# Patient Record
Sex: FEMALE | Race: WHITE | NOT HISPANIC OR LATINO | Employment: OTHER | URBAN - METROPOLITAN AREA
[De-identification: names, ages, dates, MRNs, and addresses within clinical notes are randomized per-mention and may not be internally consistent; named-entity substitution may affect disease eponyms.]

---

## 2020-03-23 ENCOUNTER — HOSPITAL ENCOUNTER (EMERGENCY)
Facility: HOSPITAL | Age: 35
Discharge: HOME | End: 2020-03-23
Admitting: EMERGENCY MEDICINE

## 2020-03-23 ENCOUNTER — APPOINTMENT (OUTPATIENT)
Dept: CT IMAGING | Facility: HOSPITAL | Age: 35
End: 2020-03-23

## 2020-03-23 ENCOUNTER — APPOINTMENT (OUTPATIENT)
Dept: GENERAL RADIOLOGY | Facility: HOSPITAL | Age: 35
End: 2020-03-23

## 2020-03-23 VITALS
WEIGHT: 274.91 LBS | SYSTOLIC BLOOD PRESSURE: 121 MMHG | BODY MASS INDEX: 48.71 KG/M2 | HEIGHT: 63 IN | DIASTOLIC BLOOD PRESSURE: 74 MMHG | RESPIRATION RATE: 16 BRPM | TEMPERATURE: 98 F | HEART RATE: 62 BPM | OXYGEN SATURATION: 98 %

## 2020-03-23 DIAGNOSIS — S09.90XA MINOR HEAD INJURY, INITIAL ENCOUNTER: ICD-10-CM
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
## 2020-03-23 DIAGNOSIS — S39.012A STRAIN OF LUMBAR REGION, INITIAL ENCOUNTER: ICD-10-CM

## 2020-03-23 DIAGNOSIS — S16.1XXA STRAIN OF NECK MUSCLE, INITIAL ENCOUNTER: ICD-10-CM

## 2020-03-23 LAB — B-HCG UR QL: NEGATIVE

## 2020-03-23 PROCEDURE — 72125 CT NECK SPINE W/O DYE: CPT

## 2020-03-23 PROCEDURE — 70450 CT HEAD/BRAIN W/O DYE: CPT

## 2020-03-23 PROCEDURE — 72110 X-RAY EXAM L-2 SPINE 4/>VWS: CPT

## 2020-03-23 PROCEDURE — 72131 CT LUMBAR SPINE W/O DYE: CPT

## 2020-03-23 PROCEDURE — 99283 EMERGENCY DEPT VISIT LOW MDM: CPT

## 2020-03-23 PROCEDURE — 81025 URINE PREGNANCY TEST: CPT | Performed by: NURSE PRACTITIONER

## 2020-03-23 RX ORDER — CYCLOBENZAPRINE HCL 10 MG
10 TABLET ORAL 3 TIMES DAILY PRN
Qty: 15 TABLET | Refills: 0 | Status: SHIPPED | OUTPATIENT
Start: 2020-03-23

## 2020-08-18 ENCOUNTER — HOSPITAL ENCOUNTER (EMERGENCY)
Facility: HOSPITAL | Age: 35
Discharge: HOME | End: 2020-08-18
Attending: EMERGENCY MEDICINE | Admitting: EMERGENCY MEDICINE

## 2020-08-18 ENCOUNTER — APPOINTMENT (OUTPATIENT)
Dept: CT IMAGING | Facility: HOSPITAL | Age: 35
End: 2020-08-18

## 2020-08-18 VITALS
TEMPERATURE: 98.1 F | SYSTOLIC BLOOD PRESSURE: 110 MMHG | WEIGHT: 240.52 LBS | DIASTOLIC BLOOD PRESSURE: 71 MMHG | HEART RATE: 125 BPM | BODY MASS INDEX: 42.62 KG/M2 | RESPIRATION RATE: 15 BRPM | HEIGHT: 63 IN | OXYGEN SATURATION: 99 %

## 2020-08-18 DIAGNOSIS — S30.1XXA CONTUSION OF ABDOMINAL WALL, INITIAL ENCOUNTER: ICD-10-CM

## 2020-08-18 DIAGNOSIS — S22.41XA CLOSED FRACTURE OF MULTIPLE RIBS OF RIGHT SIDE, INITIAL ENCOUNTER: ICD-10-CM
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
## 2020-08-18 PROCEDURE — 99282 EMERGENCY DEPT VISIT SF MDM: CPT

## 2020-08-18 PROCEDURE — 71250 CT THORAX DX C-: CPT

## 2020-08-18 PROCEDURE — 74176 CT ABD & PELVIS W/O CONTRAST: CPT

## 2020-08-18 RX ORDER — SODIUM CHLORIDE 0.9 % (FLUSH) 0.9 %
10 SYRINGE (ML) INJECTION AS NEEDED
Status: DISCONTINUED | OUTPATIENT
Start: 2020-08-18 | End: 2020-08-18

## 2020-08-18 RX ORDER — METHADONE HYDROCHLORIDE 10 MG/ML
105 CONCENTRATE ORAL DAILY
COMMUNITY

## 2020-08-18 RX ORDER — BUPROPION HYDROCHLORIDE 150 MG/1
TABLET, EXTENDED RELEASE ORAL 2 TIMES DAILY
COMMUNITY

## 2020-08-18 RX ORDER — HYDROCODONE BITARTRATE AND ACETAMINOPHEN 5; 325 MG/1; MG/1
1 TABLET ORAL EVERY 6 HOURS PRN
Qty: 10 TABLET | Refills: 0 | Status: SHIPPED | OUTPATIENT
Start: 2020-08-18

## 2020-08-18 RX ORDER — QUETIAPINE FUMARATE 50 MG/1
TABLET, EXTENDED RELEASE ORAL NIGHTLY
COMMUNITY
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
## 2020-08-18 NOTE — DISCHARGE INSTRUCTIONS
Cough and deep breathe.  Return for fever coughing up blood shortness of breath black or bloody stool bloody urine or any other new or worse problems or concerns.  No heavy lifting or excessive physical activity until improved.  And cleared by your primary care doctor.  Do not combine pain medicines with your methadone one or the other
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
## 2022-01-21 ENCOUNTER — HOSPITAL ENCOUNTER (EMERGENCY)
Facility: HOSPITAL | Age: 37
Discharge: HOME | End: 2022-01-21
Attending: EMERGENCY MEDICINE | Admitting: EMERGENCY MEDICINE

## 2022-01-21 VITALS
RESPIRATION RATE: 16 BRPM | BODY MASS INDEX: 40.95 KG/M2 | DIASTOLIC BLOOD PRESSURE: 76 MMHG | WEIGHT: 239.86 LBS | OXYGEN SATURATION: 97 % | HEIGHT: 64 IN | HEART RATE: 59 BPM | SYSTOLIC BLOOD PRESSURE: 134 MMHG | TEMPERATURE: 98.8 F

## 2022-01-21 DIAGNOSIS — R11.2 NON-INTRACTABLE VOMITING WITH NAUSEA, UNSPECIFIED VOMITING TYPE: Primary | ICD-10-CM

## 2022-01-21 LAB
ALBUMIN SERPL-MCNC: 4.7 G/DL (ref 3.5–5.2)
ALBUMIN/GLOB SERPL: 1.2 G/DL
ALP SERPL-CCNC: 95 U/L (ref 39–117)
ALT SERPL W P-5'-P-CCNC: 26 U/L (ref 1–33)
AMPHET+METHAMPHET UR QL: NEGATIVE
ANION GAP SERPL CALCULATED.3IONS-SCNC: 12.4 MMOL/L (ref 5–15)
AST SERPL-CCNC: 22 U/L (ref 1–32)
BACTERIA UR QL AUTO: ABNORMAL /HPF
BARBITURATES UR QL SCN: NEGATIVE
BASOPHILS # BLD AUTO: 0.04 10*3/MM3 (ref 0–0.2)
BASOPHILS NFR BLD AUTO: 0.5 % (ref 0–1.5)
BENZODIAZ UR QL SCN: NEGATIVE
BILIRUB SERPL-MCNC: 0.4 MG/DL (ref 0–1.2)
BILIRUB UR QL STRIP: NEGATIVE
BUN SERPL-MCNC: 14 MG/DL (ref 6–20)
BUN/CREAT SERPL: 10.8 (ref 7–25)
CALCIUM SPEC-SCNC: 10.4 MG/DL (ref 8.6–10.5)
CANNABINOIDS SERPL QL: NEGATIVE
CHLORIDE SERPL-SCNC: 96 MMOL/L (ref 98–107)
CLARITY UR: ABNORMAL
CO2 SERPL-SCNC: 27.6 MMOL/L (ref 22–29)
COCAINE UR QL: NEGATIVE
COLOR UR: YELLOW
CREAT SERPL-MCNC: 1.3 MG/DL (ref 0.57–1)
DEPRECATED RDW RBC AUTO: 39.5 FL (ref 37–54)
EOSINOPHIL # BLD AUTO: 0.02 10*3/MM3 (ref 0–0.4)
EOSINOPHIL NFR BLD AUTO: 0.2 % (ref 0.3–6.2)
ERYTHROCYTE [DISTWIDTH] IN BLOOD BY AUTOMATED COUNT: 12.6 % (ref 12.3–15.4)
GFR SERPL CREATININE-BSD FRML MDRD: 46 ML/MIN/1.73
GLOBULIN UR ELPH-MCNC: 3.9 GM/DL
GLUCOSE SERPL-MCNC: 116 MG/DL (ref 65–99)
GLUCOSE UR STRIP-MCNC: NEGATIVE MG/DL
HCG INTACT+B SERPL-ACNC: <0.5 MIU/ML
HCT VFR BLD AUTO: 42.8 % (ref 34–46.6)
HGB BLD-MCNC: 14.2 G/DL (ref 12–15.9)
HGB UR QL STRIP.AUTO: NEGATIVE
HOLD SPECIMEN: NORMAL
HOLD SPECIMEN: NORMAL
HYALINE CASTS UR QL AUTO: ABNORMAL /LPF
IMM GRANULOCYTES # BLD AUTO: 0.02 10*3/MM3 (ref 0–0.05)
IMM GRANULOCYTES NFR BLD AUTO: 0.2 % (ref 0–0.5)
KETONES UR QL STRIP: NEGATIVE
LEUKOCYTE ESTERASE UR QL STRIP.AUTO: ABNORMAL
LIPASE SERPL-CCNC: 23 U/L (ref 13–60)
LYMPHOCYTES # BLD AUTO: 1.91 10*3/MM3 (ref 0.7–3.1)
LYMPHOCYTES NFR BLD AUTO: 21.5 % (ref 19.6–45.3)
MCH RBC QN AUTO: 28.7 PG (ref 26.6–33)
MCHC RBC AUTO-ENTMCNC: 33.2 G/DL (ref 31.5–35.7)
MCV RBC AUTO: 86.6 FL (ref 79–97)
METHADONE UR QL SCN: NEGATIVE
MONOCYTES # BLD AUTO: 0.71 10*3/MM3 (ref 0.1–0.9)
MONOCYTES NFR BLD AUTO: 8 % (ref 5–12)
NEUTROPHILS NFR BLD AUTO: 6.18 10*3/MM3 (ref 1.7–7)
NEUTROPHILS NFR BLD AUTO: 69.6 % (ref 42.7–76)
NITRITE UR QL STRIP: NEGATIVE
NRBC BLD AUTO-RTO: 0 /100 WBC (ref 0–0.2)
OPIATES UR QL: NEGATIVE
OXYCODONE UR QL SCN: NEGATIVE
PH UR STRIP.AUTO: 7.5 [PH] (ref 5–8)
PLATELET # BLD AUTO: 339 10*3/MM3 (ref 140–450)
PMV BLD AUTO: 9.8 FL (ref 6–12)
POTASSIUM SERPL-SCNC: 3.5 MMOL/L (ref 3.5–5.2)
PROT SERPL-MCNC: 8.6 G/DL (ref 6–8.5)
PROT UR QL STRIP: ABNORMAL
RBC # BLD AUTO: 4.94 10*6/MM3 (ref 3.77–5.28)
RBC # UR STRIP: ABNORMAL /HPF
REF LAB TEST METHOD: ABNORMAL
SODIUM SERPL-SCNC: 136 MMOL/L (ref 136–145)
SP GR UR STRIP: 1.03 (ref 1–1.03)
SQUAMOUS #/AREA URNS HPF: ABNORMAL /HPF
UROBILINOGEN UR QL STRIP: ABNORMAL
WBC # UR STRIP: ABNORMAL /HPF
WBC NRBC COR # BLD: 8.88 10*3/MM3 (ref 3.4–10.8)
WHOLE BLOOD HOLD SPECIMEN: NORMAL
WHOLE BLOOD HOLD SPECIMEN: NORMAL

## 2022-01-21 PROCEDURE — 80307 DRUG TEST PRSMV CHEM ANLYZR: CPT | Performed by: NURSE PRACTITIONER

## 2022-01-21 PROCEDURE — 99283 EMERGENCY DEPT VISIT LOW MDM: CPT

## 2022-01-21 PROCEDURE — 81001 URINALYSIS AUTO W/SCOPE: CPT | Performed by: EMERGENCY MEDICINE

## 2022-01-21 PROCEDURE — 85025 COMPLETE CBC W/AUTO DIFF WBC: CPT | Performed by: EMERGENCY MEDICINE

## 2022-01-21 PROCEDURE — 96374 THER/PROPH/DIAG INJ IV PUSH: CPT

## 2022-01-21 PROCEDURE — 96361 HYDRATE IV INFUSION ADD-ON: CPT

## 2022-01-21 PROCEDURE — 80053 COMPREHEN METABOLIC PANEL: CPT | Performed by: EMERGENCY MEDICINE

## 2022-01-21 PROCEDURE — 83690 ASSAY OF LIPASE: CPT | Performed by: EMERGENCY MEDICINE

## 2022-01-21 PROCEDURE — 25010000002 ONDANSETRON PER 1 MG: Performed by: NURSE PRACTITIONER

## 2022-01-21 PROCEDURE — 84702 CHORIONIC GONADOTROPIN TEST: CPT | Performed by: EMERGENCY MEDICINE

## 2022-01-21 RX ORDER — ONDANSETRON 2 MG/ML
4 INJECTION INTRAMUSCULAR; INTRAVENOUS ONCE
Status: COMPLETED | OUTPATIENT
Start: 2022-01-21 | End: 2022-01-21

## 2022-01-21 RX ORDER — SODIUM CHLORIDE 0.9 % (FLUSH) 0.9 %
10 SYRINGE (ML) INJECTION AS NEEDED
Status: DISCONTINUED | OUTPATIENT
Start: 2022-01-21 | End: 2022-01-21 | Stop reason: HOSPADM

## 2022-01-21 RX ORDER — ONDANSETRON 4 MG/1
4 TABLET, ORALLY DISINTEGRATING ORAL EVERY 4 HOURS PRN
Qty: 15 TABLET | Refills: 0 | Status: SHIPPED | OUTPATIENT
Start: 2022-01-21

## 2022-01-21 RX ADMIN — SODIUM CHLORIDE 1000 ML: 9 INJECTION, SOLUTION INTRAVENOUS at 06:03

## 2022-01-21 RX ADMIN — SODIUM CHLORIDE 1000 ML: 9 INJECTION, SOLUTION INTRAVENOUS at 08:10

## 2022-01-21 RX ADMIN — ONDANSETRON 4 MG: 2 INJECTION INTRAMUSCULAR; INTRAVENOUS at 06:03
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
## 2022-01-21 NOTE — DISCHARGE INSTRUCTIONS
Clear liquids.  Advance diet as tolerated.    Medication as prescribed.    Follow-up with PCP as needed.    Return for new or worsening symptoms

## 2024-08-28 ENCOUNTER — HOSPITAL ENCOUNTER (EMERGENCY)
Facility: HOSPITAL | Age: 39
Discharge: HOME | End: 2024-08-28
Attending: EMERGENCY MEDICINE
Payer: COMMERCIAL

## 2024-08-28 VITALS
OXYGEN SATURATION: 96 % | SYSTOLIC BLOOD PRESSURE: 113 MMHG | HEIGHT: 62 IN | HEART RATE: 70 BPM | RESPIRATION RATE: 18 BRPM | DIASTOLIC BLOOD PRESSURE: 62 MMHG | BODY MASS INDEX: 43.24 KG/M2 | TEMPERATURE: 98.3 F | WEIGHT: 235 LBS

## 2024-08-28 DIAGNOSIS — Z13.9 ENCOUNTER FOR MEDICAL SCREENING EXAMINATION: Primary | ICD-10-CM

## 2024-08-28 LAB
QT INTERVAL: 397 MS
QTC INTERVAL: 426 MS

## 2024-08-28 PROCEDURE — 99283 EMERGENCY DEPT VISIT LOW MDM: CPT

## 2024-08-28 PROCEDURE — 93005 ELECTROCARDIOGRAM TRACING: CPT | Performed by: EMERGENCY MEDICINE

## 2024-08-28 NOTE — ED PROVIDER NOTES
Time: 8:21 AM EDT  Date of encounter:  2024  Independent Historian/Clinical History and Information was obtained by:   Patient  Chief Complaint: Needing an EKG for medication    History is limited by: N/A    History of Present Illness:  Patient is a 39 y.o. year old female who presents to the emergency department for evaluation of possible QT prolongation.  Patient is currently in treatment at [facility].  The patient is that this is a methadone clinic.  Patient states that she was sent to the ER to have an EKG done due to her receiving methadone.  Patient has no physical complaints.  Patient has a letter with her from an advanced practitioner stating the patient is needing a baseline EKG related to QT prolongation and medication dosing.    HPI    Patient Care Team  Primary Care Provider: Provider, No Known    Past Medical History:     Allergies   Allergen Reactions    Codeine Nausea And Vomiting     Past Medical History:   Diagnosis Date    Bipolar 1 disorder     Depression      Past Surgical History:   Procedure Laterality Date     SECTION      CHOLECYSTECTOMY      REPLACEMENT TOTAL HIP LATERAL POSITION       History reviewed. No pertinent family history.    Home Medications:  Prior to Admission medications    Medication Sig Start Date End Date Taking? Authorizing Provider   buPROPion SR (WELLBUTRIN SR) 150 MG 12 hr tablet Take  by mouth 2 (Two) Times a Day.    Provider, MD Oscar   cyclobenzaprine (FLEXERIL) 10 MG tablet Take 1 tablet by mouth 3 (Three) Times a Day As Needed for Muscle Spasms. 3/23/20   Dinorah Watson APRN   diclofenac (VOLTAREN) 50 MG EC tablet Take 1 tablet by mouth 2 (Two) Times a Day As Needed (pain). 3/23/20   Dinorah Watson APRN   HYDROcodone-acetaminophen (NORCO) 5-325 MG per tablet Take 1 tablet by mouth Every 6 (Six) Hours As Needed for Severe Pain . 20   Clayton Beck MD   methadone (DOLOPHINE) 10 MG/ML solution  "Take 105 mg by mouth Daily.    ProviderOscar MD   ondansetron ODT (ZOFRAN-ODT) 4 MG disintegrating tablet Place 1 tablet on the tongue Every 4 (Four) Hours As Needed for Nausea or Vomiting. 1/21/22   Chiquita Mathews APRN   QUEtiapine fumarate ER (SEROquel XR) 50 MG tablet sustained-release 24 hour tablet Take  by mouth Every Night.    ProviderOscar MD   Topiramate (TOPAMAX PO) Take  by mouth.    Oscar Schneider MD        Social History:   Social History     Tobacco Use    Smoking status: Every Day     Current packs/day: 1.00     Types: Cigarettes   Substance Use Topics    Alcohol use: Never    Drug use: Not Currently         Review of Systems:  Review of Systems   Constitutional:  Negative for chills and fever.   HENT:  Negative for congestion, ear pain and sore throat.    Eyes:  Negative for pain.   Respiratory:  Negative for cough, chest tightness and shortness of breath.    Cardiovascular:  Negative for chest pain.   Gastrointestinal:  Negative for abdominal pain, diarrhea, nausea and vomiting.   Genitourinary:  Negative for flank pain and hematuria.   Musculoskeletal:  Negative for joint swelling.   Skin:  Negative for pallor.   Neurological:  Negative for seizures and headaches.   All other systems reviewed and are negative.       Physical Exam:  /62 (BP Location: Left arm, Patient Position: Sitting)   Pulse 70   Temp 98.3 °F (36.8 °C) (Oral)   Resp 18   Ht 157.5 cm (62\")   Wt 107 kg (235 lb)   LMP  (LMP Unknown)   SpO2 96%   BMI 42.98 kg/m²     Physical Exam    Vital signs were reviewed under triage note.  General appearance - Patient appears well-developed and well-nourished.  Patient is in no acute distress.  Head - Normocephalic, atraumatic.  Pupils - Equal, round, reactive to light.  Extraocular muscles are intact.  Conjunctiva is clear.  Nasal - Normal inspection.  No evidence of trauma or epistaxis.  Tympanic membranes - Gray, intact without erythema or " retractions.  Oral mucosa - Pink and moist without lesions or erythema.  Uvula is midline.  Chest wall - Atraumatic.  Chest wall is nontender.  There are no vesicular rashes noted.  Neck - Supple.  Trachea was midline.  There is no palpable lymphadenopathy or thyromegaly.  There are no meningeal signs  Lungs - Clear to auscultation and percussion bilaterally.  Heart - Regular rate and rhythm without any murmurs, clicks, or gallops.  Abdomen - Soft.  Obese.  Bowel sounds are present.  There is no palpable tenderness.  There is no rebound, guarding, or rigidity.  There are no palpable masses.  There are no pulsatile masses.  Back - Spine is straight and midline.  There is no CVA tenderness.  Extremities - Intact x4 with full range of motion.  There is no palpable edema.  Pulses are intact x4 and equal.  Neurologic - Patient is awake, alert, and oriented x3.  Cranial nerves II through XII are grossly intact.  Motor and sensory functions grossly intact.  Cerebellar function was normal.  Integument - There are no rashes.  There are no petechia or purpura lesions noted.  There are no vesicular lesions noted.           Procedures:  Procedures      Medical Decision Making:      Comorbidities that affect care:    Depression, bipolar disorder    External Notes reviewed:    Previous Clinic Note: Clinic note from [facility] done on 5/17/2024 was reviewed by me.      The following orders were placed and all results were independently analyzed by me:  Orders Placed This Encounter   Procedures    ECG 12 Lead Drug Monitoring       Medications Given in the Emergency Department:  Medications - No data to display     ED Course:    ED Course as of 08/28/24 1803   Wed Aug 28, 2024   0820 EKG performed at 735 was interpreted by me to show a normal sinus rhythm with a ventricular rate of 69 bpm.  The IL intervals 137 ms.  P waves are normal.  QRS interval is normal.  Axis was at 54 degrees.  There was no acute ischemic ST or T wave changes  identified.  QT corrected is normal at 426 ms. [TB]      ED Course User Index  [TB] Moses Eden DO       The patient was seen and evaluated the ED by me.  The above history and physical examination was performed.  EKG was performed.  EKG did not show any acute abnormalities.  Final interpretation is pending.  At this time patient be safely discharged home with outpatient follow-up.  I did explain to the patient that her provider who is managing her medication should either interpret the EKG herself and/or wait for the cardiology interpretation to ensure that there are no other abnormalities that would become worrisome from their medication standpoint.    Labs:    Lab Results (last 24 hours)       ** No results found for the last 24 hours. **             Imaging:    No Radiology Exams Resulted Within Past 24 Hours      Differential Diagnosis and Discussion:    Rule out QT prolongation due to medications.    EKG was interpreted by me.    MDM     Amount and/or Complexity of Data Reviewed  Tests in the medicine section of CPT®: reviewed             Patient Care Considerations:    LABS: I considered ordering labs, however laboratory data would not alter the ED treatment course or plan.      Consultants/Shared Management Plan:    None    Social Determinants of Health:    Patient is independent, reliable, and has access to care.       Disposition and Care Coordination:    Discharged: The patient is suitable and stable for discharge with no need for consideration of admission.    I have explained the patient´s condition, diagnoses and treatment plan based on the information available to me at this time. I have answered questions and addressed any concerns. The patient has a good  understanding of the patient´s diagnosis, condition, and treatment plan as can be expected at this point. The vital signs have been stable. The patient´s condition is stable and appropriate for discharge from the emergency department.      The  patient will pursue further outpatient evaluation with the primary care physician or other designated or consulting physician as outlined in the discharge instructions. They are agreeable to this plan of care and follow-up instructions have been explained in detail. The patient has received these instructions in written format and has expressed an understanding of the discharge instructions. The patient is aware that any significant change in condition or worsening of symptoms should prompt an immediate return to this or the closest emergency department or call to 911.    Final diagnoses:   Encounter for medical screening examination        ED Disposition       ED Disposition   Discharge    Condition   Stable    Comment   --               This medical record created using voice recognition software.             Moses Eden DO  08/28/24 1804

## 2024-08-28 NOTE — DISCHARGE INSTRUCTIONS
Resume normal home activities.  Follow-up with your medical provider as scheduled.  Return to the ER for any concerns issues that may arise.

## 2024-09-16 LAB
QT INTERVAL: 397 MS
QTC INTERVAL: 426 MS

## 2025-04-07 ENCOUNTER — HOSPITAL ENCOUNTER (EMERGENCY)
Facility: HOSPITAL | Age: 40
Discharge: HOME | End: 2025-04-08
Attending: EMERGENCY MEDICINE | Admitting: EMERGENCY MEDICINE
Payer: COMMERCIAL

## 2025-04-07 DIAGNOSIS — T81.30XA WOUND DEHISCENCE: Primary | ICD-10-CM

## 2025-04-07 DIAGNOSIS — Z51.89 VISIT FOR WOUND CHECK: ICD-10-CM

## 2025-04-07 LAB
ALBUMIN SERPL-MCNC: 2.9 G/DL (ref 3.5–5.2)
ALBUMIN/GLOB SERPL: 0.9 G/DL
ALP SERPL-CCNC: 95 U/L (ref 39–117)
ALT SERPL W P-5'-P-CCNC: 18 U/L (ref 1–33)
ANION GAP SERPL CALCULATED.3IONS-SCNC: 9.5 MMOL/L (ref 5–15)
AST SERPL-CCNC: 17 U/L (ref 1–32)
BASOPHILS # BLD AUTO: 0.06 10*3/MM3 (ref 0–0.2)
BASOPHILS NFR BLD AUTO: 0.7 % (ref 0–1.5)
BILIRUB SERPL-MCNC: 0.2 MG/DL (ref 0–1.2)
BUN SERPL-MCNC: 12 MG/DL (ref 6–20)
BUN/CREAT SERPL: 13.6 (ref 7–25)
CALCIUM SPEC-SCNC: 8.8 MG/DL (ref 8.6–10.5)
CHLORIDE SERPL-SCNC: 103 MMOL/L (ref 98–107)
CO2 SERPL-SCNC: 26.5 MMOL/L (ref 22–29)
CREAT SERPL-MCNC: 0.88 MG/DL (ref 0.57–1)
DEPRECATED RDW RBC AUTO: 45.3 FL (ref 37–54)
EGFRCR SERPLBLD CKD-EPI 2021: 85.3 ML/MIN/1.73
EOSINOPHIL # BLD AUTO: 0.28 10*3/MM3 (ref 0–0.4)
EOSINOPHIL NFR BLD AUTO: 3.2 % (ref 0.3–6.2)
ERYTHROCYTE [DISTWIDTH] IN BLOOD BY AUTOMATED COUNT: 13.2 % (ref 12.3–15.4)
GLOBULIN UR ELPH-MCNC: 3.2 GM/DL
GLUCOSE SERPL-MCNC: 97 MG/DL (ref 65–99)
HCT VFR BLD AUTO: 30.9 % (ref 34–46.6)
HGB BLD-MCNC: 9.6 G/DL (ref 12–15.9)
HOLD SPECIMEN: NORMAL
HOLD SPECIMEN: NORMAL
IMM GRANULOCYTES # BLD AUTO: 0.05 10*3/MM3 (ref 0–0.05)
IMM GRANULOCYTES NFR BLD AUTO: 0.6 % (ref 0–0.5)
LYMPHOCYTES # BLD AUTO: 2.02 10*3/MM3 (ref 0.7–3.1)
LYMPHOCYTES NFR BLD AUTO: 23.4 % (ref 19.6–45.3)
MCH RBC QN AUTO: 29.5 PG (ref 26.6–33)
MCHC RBC AUTO-ENTMCNC: 31.1 G/DL (ref 31.5–35.7)
MCV RBC AUTO: 95.1 FL (ref 79–97)
MONOCYTES # BLD AUTO: 0.74 10*3/MM3 (ref 0.1–0.9)
MONOCYTES NFR BLD AUTO: 8.6 % (ref 5–12)
NEUTROPHILS NFR BLD AUTO: 5.47 10*3/MM3 (ref 1.7–7)
NEUTROPHILS NFR BLD AUTO: 63.5 % (ref 42.7–76)
NRBC BLD AUTO-RTO: 0 /100 WBC (ref 0–0.2)
PLATELET # BLD AUTO: 360 10*3/MM3 (ref 140–450)
PMV BLD AUTO: 9.3 FL (ref 6–12)
POTASSIUM SERPL-SCNC: 4.1 MMOL/L (ref 3.5–5.2)
PROT SERPL-MCNC: 6.1 G/DL (ref 6–8.5)
RBC # BLD AUTO: 3.25 10*6/MM3 (ref 3.77–5.28)
SODIUM SERPL-SCNC: 139 MMOL/L (ref 136–145)
WBC NRBC COR # BLD AUTO: 8.62 10*3/MM3 (ref 3.4–10.8)
WHOLE BLOOD HOLD COAG: NORMAL
WHOLE BLOOD HOLD SPECIMEN: NORMAL

## 2025-04-07 PROCEDURE — 85025 COMPLETE CBC W/AUTO DIFF WBC: CPT

## 2025-04-07 PROCEDURE — 36415 COLL VENOUS BLD VENIPUNCTURE: CPT

## 2025-04-07 PROCEDURE — 80053 COMPREHEN METABOLIC PANEL: CPT

## 2025-04-07 PROCEDURE — 99283 EMERGENCY DEPT VISIT LOW MDM: CPT

## 2025-04-08 VITALS
TEMPERATURE: 98.9 F | DIASTOLIC BLOOD PRESSURE: 84 MMHG | HEART RATE: 82 BPM | WEIGHT: 267.2 LBS | OXYGEN SATURATION: 98 % | BODY MASS INDEX: 45.62 KG/M2 | HEIGHT: 64 IN | SYSTOLIC BLOOD PRESSURE: 121 MMHG | RESPIRATION RATE: 18 BRPM

## 2025-04-08 PROCEDURE — 87070 CULTURE OTHR SPECIMN AEROBIC: CPT

## 2025-04-08 PROCEDURE — 87205 SMEAR GRAM STAIN: CPT

## 2025-04-08 RX ORDER — DOXYCYCLINE 100 MG/1
100 CAPSULE ORAL 2 TIMES DAILY
Qty: 14 CAPSULE | Refills: 0 | Status: SHIPPED | OUTPATIENT
Start: 2025-04-08 | End: 2025-04-08

## 2025-04-08 RX ORDER — DOXYCYCLINE 100 MG/1
100 CAPSULE ORAL 2 TIMES DAILY
Qty: 20 CAPSULE | Refills: 0 | Status: SHIPPED | OUTPATIENT
Start: 2025-04-08 | End: 2025-04-18

## 2025-04-08 NOTE — ED PROVIDER NOTES
Time: 11:51 PM EDT  Date of encounter:  2025  Independent Historian/Clinical History and Information was obtained by:   Patient    History is limited by: N/A    Chief Complaint: Wound       History of Present Illness:  Patient is a 40 y.o. year old female who presents to the emergency department for evaluation of wound drainage and dehiscence. She had plastic surgery done in [place] on 3/21/25. Patient was seen by her PCP and completed a round keflex. Denies any fever but she is worried for any infection. States that the wound on underneath her right breast opened up last night. She reports that she had a lymphatic/wound care appointment scheduled. Patient has a history of MRSA over 10 years ago in her hip joints.    Patient Care Team  Primary Care Provider: Provider, No Known    Past Medical History:     Allergies   Allergen Reactions    Codeine Nausea And Vomiting     Past Medical History:   Diagnosis Date    Bipolar 1 disorder     Depression      Past Surgical History:   Procedure Laterality Date     SECTION      CHOLECYSTECTOMY      REPLACEMENT TOTAL HIP LATERAL POSITION       History reviewed. No pertinent family history.    Home Medications:  Prior to Admission medications    Medication Sig Start Date End Date Taking? Authorizing Provider   buPROPion SR (WELLBUTRIN SR) 150 MG 12 hr tablet Take  by mouth 2 (Two) Times a Day.    ProviderOscar MD   cyclobenzaprine (FLEXERIL) 10 MG tablet Take 1 tablet by mouth 3 (Three) Times a Day As Needed for Muscle Spasms. 3/23/20   Dinorah Watson APRN   diclofenac (VOLTAREN) 50 MG EC tablet Take 1 tablet by mouth 2 (Two) Times a Day As Needed (pain). 3/23/20   Dinorah Watson APRN   HYDROcodone-acetaminophen (NORCO) 5-325 MG per tablet Take 1 tablet by mouth Every 6 (Six) Hours As Needed for Severe Pain . 20   Clayton Beck MD   methadone (DOLOPHINE) 10 MG/ML solution Take 10.5 mL by mouth Daily.    ProviderOscar MD  "  ondansetron ODT (ZOFRAN-ODT) 4 MG disintegrating tablet Place 1 tablet on the tongue Every 4 (Four) Hours As Needed for Nausea or Vomiting. 1/21/22   Chiquita Mathews APRN   QUEtiapine fumarate ER (SEROquel XR) 50 MG tablet sustained-release 24 hour tablet Take  by mouth Every Night.    Provider, Oscar, MD   Topiramate (TOPAMAX PO) Take  by mouth.    Provider, Historical, MD        Social History:   Social History     Tobacco Use    Smoking status: Every Day     Current packs/day: 1.00     Types: Cigarettes     Passive exposure: Current   Vaping Use    Vaping status: Some Days   Substance Use Topics    Alcohol use: Never    Drug use: Yes     Types: Marijuana     Comment: Pr states she smokes marijuana some over the past couple of days         Review of Systems:  Review of Systems   Constitutional:  Negative for chills and fever.   HENT:  Negative for ear pain.    Eyes:  Negative for pain.   Respiratory:  Negative for cough and shortness of breath.    Cardiovascular:  Negative for chest pain.   Gastrointestinal:  Negative for abdominal pain, diarrhea, nausea and vomiting.   Genitourinary:  Negative for dysuria.   Musculoskeletal:  Negative for arthralgias.   Skin:  Positive for wound. Negative for rash.   Neurological:  Negative for headaches.        Physical Exam:  /84 (BP Location: Left arm, Patient Position: Sitting)   Pulse 82   Temp 98.8 °F (37.1 °C) (Oral)   Resp 18   Ht 162.6 cm (64\")   Wt 121 kg (267 lb 3.2 oz)   LMP 03/24/2025   SpO2 98%   BMI 45.86 kg/m²     Physical Exam  Vitals and nursing note reviewed.   Constitutional:       General: She is not in acute distress.     Appearance: Normal appearance.   HENT:      Head: Normocephalic and atraumatic.      Nose: Nose normal.      Mouth/Throat:      Mouth: Mucous membranes are moist.   Eyes:      Pupils: Pupils are equal, round, and reactive to light.   Cardiovascular:      Rate and Rhythm: Normal rate and regular rhythm.      Pulses: Normal " pulses.      Heart sounds: Normal heart sounds.   Pulmonary:      Effort: Pulmonary effort is normal.      Breath sounds: Normal breath sounds.   Abdominal:      General: Abdomen is flat. Bowel sounds are normal.      Palpations: Abdomen is soft.   Musculoskeletal:         General: Normal range of motion.      Cervical back: Normal range of motion.      Right lower leg: No edema.      Left lower leg: No edema.   Skin:     General: Skin is warm and dry.      Capillary Refill: Capillary refill takes less than 2 seconds.      Comments: Right breast: wound dehiscence under breast, there is a possible foreign body seen inside the right breast. Pt was told that this was the implant. No purulent drainage or malodor. No cellulitis.    There are also multiple wounds with varying healing on her abdomen. There are some small dehiscence in this area as well. Again, no cellulitis or malodor.   Neurological:      General: No focal deficit present.      Mental Status: She is alert.   Psychiatric:         Mood and Affect: Mood normal.         Behavior: Behavior normal.                    Medical Decision Making:      Comorbidities that affect care:    None    External Notes reviewed:    Previous Clinic Note: Reviewed clinic note on 3/31/25.      The following orders were placed and all results were independently analyzed by me:  Orders Placed This Encounter   Procedures    Wound Culture - Wound, Breast, Right    [place] Draw    Comprehensive Metabolic Panel    CBC Auto Differential    CBC & Differential    Green Top (Gel)    Lavender Top    Gold Top - SST    Light Blue Top       Medications Given in the Emergency Department:  Medications - No data to display     ED Course:         Labs:    Lab Results (last 24 hours)       Procedure Component Value Units Date/Time    CBC & Differential [[id number]]  (Abnormal) Collected: 04/07/25 2312    Specimen: Blood Updated: 04/07/25 2323    Narrative:      The following orders were created for  panel order CBC & Differential.  Procedure                               Abnormality         Status                     ---------                               -----------         ------                     CBC Auto Differential[[id number]]        Abnormal            Final result                 Please view results for these tests on the individual orders.    Comprehensive Metabolic Panel [[id number]]  (Abnormal) Collected: 04/07/25 2312    Specimen: Blood Updated: 04/07/25 2341     Glucose 97 mg/dL      BUN 12 mg/dL      Creatinine 0.88 mg/dL      Sodium 139 mmol/L      Potassium 4.1 mmol/L      Chloride 103 mmol/L      CO2 26.5 mmol/L      Calcium 8.8 mg/dL      Total Protein 6.1 g/dL      Albumin 2.9 g/dL      ALT (SGPT) 18 U/L      AST (SGOT) 17 U/L      Alkaline Phosphatase 95 U/L      Total Bilirubin 0.2 mg/dL      Globulin 3.2 gm/dL      A/G Ratio 0.9 g/dL      BUN/Creatinine Ratio 13.6     Anion Gap 9.5 mmol/L      eGFR 85.3 mL/min/1.73     Narrative:      GFR Categories in Chronic Kidney Disease (CKD)      GFR Category          GFR (mL/min/1.73)    Interpretation  G1                     90 or greater         Normal or high (1)  G2                      60-89                Mild decrease (1)  G3a                   45-59                Mild to moderate decrease  G3b                   30-44                Moderate to severe decrease  G4                    15-29                Severe decrease  G5                    14 or less           Kidney failure          (1)In the absence of evidence of kidney disease, neither GFR category G1 or G2 fulfill the criteria for CKD.    eGFR calculation 2021 CKD-EPI creatinine equation, which does not include race as a factor    CBC Auto Differential [[id number]]  (Abnormal) Collected: 04/07/25 2312    Specimen: Blood Updated: 04/07/25 2323     WBC 8.62 10*3/mm3      RBC 3.25 10*6/mm3      Hemoglobin 9.6 g/dL      Hematocrit 30.9 %      MCV 95.1 fL      MCH 29.5 pg      MCHC 31.1  g/dL      RDW 13.2 %      RDW-SD 45.3 fl      MPV 9.3 fL      Platelets 360 10*3/mm3      Neutrophil % 63.5 %      Lymphocyte % 23.4 %      Monocyte % 8.6 %      Eosinophil % 3.2 %      Basophil % 0.7 %      Immature Grans % 0.6 %      Neutrophils, Absolute 5.47 10*3/mm3      Lymphocytes, Absolute 2.02 10*3/mm3      Monocytes, Absolute 0.74 10*3/mm3      Eosinophils, Absolute 0.28 10*3/mm3      Basophils, Absolute 0.06 10*3/mm3      Immature Grans, Absolute 0.05 10*3/mm3      nRBC 0.0 /100 WBC              Imaging:    No Radiology Exams Resulted Within Past 24 Hours      Differential Diagnosis and Discussion:    Wound Evaluation: Differential diagnosis includes but is not limited to laceration, abrasion, puncture, burn, ulcer, cellulitis, abscess, vasculitis, malignancy, and rash.    PROCEDURES:    Labs were collected in the emergency department and all labs were reviewed and interpreted by me.    No orders to display       Procedures    MDM     Amount and/or Complexity of Data Reviewed  Clinical lab tests: reviewed    Risk of Complications, Morbidity, and/or Mortality  Presenting problems: moderate  Diagnostic procedures: low  Management options: low    Patient Progress  Patient progress: stable                      Patient Care Considerations:    SEPSIS was considered but is NOT present in the emergency department as SIRS criteria is not present.      Consultants/Shared Management Plan:    None    Social Determinants of Health:    Patient is independent, reliable, and has access to care.       Disposition and Care Coordination:    Discharged: The patient is suitable and stable for discharge with no need for consideration of admission.    I have explained the patient´s condition, diagnoses and treatment plan based on the information available to me at this time. I have answered questions and addressed any concerns. The patient has a good  understanding of the patient´s diagnosis, condition, and treatment plan as  can be expected at this point. The vital signs have been stable. The patient´s condition is stable and appropriate for discharge from the emergency department.      The patient will pursue further outpatient evaluation with the primary care physician or other designated or consulting physician as outlined in the discharge instructions. They are agreeable to this plan of care and follow-up instructions have been explained in detail. The patient has received these instructions in written format and has expressed an understanding of the discharge instructions. The patient is aware that any significant change in condition or worsening of symptoms should prompt an immediate return to this or the closest emergency department or call to [facility].  I have explained discharge medications and the need for follow up with the patient/caretakers. This was also printed in the discharge instructions. Patient was discharged with the following medications and follow up:      Medication List        New Prescriptions      doxycycline 100 MG capsule  Commonly known as: MONODOX  Take 1 capsule by mouth 2 (Two) Times a Day for 10 days.               Where to Get Your Medications        These medications were sent to [facility] - [address] [phone] [phone] [address]      Phone: [phone]   doxycycline 100 MG capsule      No follow-up provider specified.     Final diagnoses:   Visit for wound check   Wound dehiscence        ED Disposition       ED Disposition   Discharge    Condition   Stable    Comment   --               This medical record created using voice recognition software.             Oscar Sun PA  04/08/25 0016

## 2025-04-08 NOTE — DISCHARGE INSTRUCTIONS
Your lab work today did not show any acute critical findings. You are still at risk for any infection since your surgical wounds have dehisced. You are being discharged home with doxycycline. Take this medication as prescribed. We will call you if the cultures come back positive.    Keep your appointment with wound care. Follow up with your PCP in 3 days.    Return to the Emergency Department if you develop any uncontrollable fever, intractable pain, nausea, vomiting.

## 2025-04-10 LAB
BACTERIA SPEC AEROBE CULT: NORMAL
GRAM STN SPEC: NORMAL
GRAM STN SPEC: NORMAL

## 2025-04-15 ENCOUNTER — OFFICE VISIT (OUTPATIENT)
Dept: WOUND CARE | Facility: HOSPITAL | Age: 40
End: 2025-04-15
Payer: COMMERCIAL

## 2025-04-15 VITALS — RESPIRATION RATE: 18 BRPM | SYSTOLIC BLOOD PRESSURE: 114 MMHG | DIASTOLIC BLOOD PRESSURE: 62 MMHG | HEART RATE: 75 BPM

## 2025-04-15 DIAGNOSIS — E66.813 CLASS 3 SEVERE OBESITY WITH BODY MASS INDEX (BMI) OF 45.0 TO 49.9 IN ADULT, UNSPECIFIED OBESITY TYPE, UNSPECIFIED WHETHER SERIOUS COMORBIDITY PRESENT: ICD-10-CM

## 2025-04-15 DIAGNOSIS — Z98.890 S/P ABDOMINOPLASTY: ICD-10-CM

## 2025-04-15 DIAGNOSIS — S21.001A OPEN WOUND OF RIGHT FEMALE BREAST, INITIAL ENCOUNTER: ICD-10-CM

## 2025-04-15 DIAGNOSIS — Z98.82 S/P BREAST AUGMENTATION: ICD-10-CM

## 2025-04-15 DIAGNOSIS — T85.43XA RUPTURED SILICONE BREAST IMPLANT, INITIAL ENCOUNTER: Primary | ICD-10-CM

## 2025-04-15 DIAGNOSIS — T86.821 FAILED FLAP: ICD-10-CM

## 2025-04-15 DIAGNOSIS — S31.109A OPEN WOUND OF ABDOMEN, INITIAL ENCOUNTER: ICD-10-CM

## 2025-04-15 DIAGNOSIS — F17.200 NICOTINE DEPENDENCE WITH CURRENT USE: ICD-10-CM

## 2025-04-15 DIAGNOSIS — E66.01 CLASS 3 SEVERE OBESITY WITH BODY MASS INDEX (BMI) OF 45.0 TO 49.9 IN ADULT, UNSPECIFIED OBESITY TYPE, UNSPECIFIED WHETHER SERIOUS COMORBIDITY PRESENT: ICD-10-CM

## 2025-04-15 DIAGNOSIS — T81.31XA SURGICAL WOUND DEHISCENCE, INITIAL ENCOUNTER: ICD-10-CM

## 2025-04-15 PROCEDURE — G0463 HOSPITAL OUTPT CLINIC VISIT: HCPCS | Performed by: NURSE PRACTITIONER

## 2025-04-15 PROCEDURE — 1160F RVW MEDS BY RX/DR IN RCRD: CPT | Performed by: NURSE PRACTITIONER

## 2025-04-15 PROCEDURE — 1159F MED LIST DOCD IN RCRD: CPT | Performed by: NURSE PRACTITIONER

## 2025-04-15 NOTE — LETTER
April 15, 2025     Patient: Ana Weathers   YOB: 1985   Date of Visit: 4/15/2025       To Whom It May Concern:    Ana Weathers was seen today in the office. She will currently require extensive home care and frequent medical visits and therefore will not be able to make daily trips to your clinic for Methadone treatments.   Will re-evaluate next week at her follow-up.            Sincerely,        LILY Leon    CC: No Recipients

## 2025-04-16 NOTE — PROGRESS NOTES
=Chief Complaint  Consult (Complications s/p mommy makeover)    Subjective          History of Present Illness      Patient is a 40-year-old woman who is referred by Dr Haile for evaluation of multiple wounds.     Patient went to [place] and underwent a number of body contouring procedures on March 21, 2025 including bilateral augmentation mastopexy, abdominoplasty, liposuction and fat grafting to her buttock.   Patient states that when she went there for surgery she was found to have lung rattles and was treated with oral antibiotics for several days prior to undergoing the above-mentioned operation.  Patient states she was also planned for submental and neck liposuction as well as medial thigh liposuction, however she was told by surgeon he was not able to proceed with those additional procedures due to concern for the amount of blood loss.  Postoperatively patient was instructed to stay in [place] for 7 days after surgery however she flew back to [place] on postoperative day 2.  Since then patient removed her abdominal and breast drains with the help of her .  Per reports she removed them earlier than instructed.    Patient states she was given compression for her abdomen but has not been wearing it due to discomfort.  Patient was given compressive surgical bra but again she has not been wearing it as she found it uncomfortable.  Patient has not been following up her postoperative activity instructions.  Patient first noted wound breakdown at the bottom of her right breast as well as right lateral abdomen on March 31, 2025.  She was seen by her primary care provider and was started on Keflex given breast implants.   Patient progressively noted widening and separation of right lower breast wound, additional wounds in her left breast and additional wound breakdown along her lower transverse abdominal incision.  Patient was scheduled to follow-up with her primary care provider on April 7, however when  "they saw the appearance of her wounds she was instructed to proceed to the emergency room.  In the emergency room she was normotensive and afebrile.  She had no signs of infection.  White blood cell count was within normal limits.  She was switched to Doxycyclin given history of MRSA infection after right hip replacement in 2013.     Right breast wound culture was obtained and final results were negative.  White blood cell count was within normal limits.  Hemoglobin was 9.6.  Patient was then referred to wound care clinic where she was evaluated on April 15, 2025.  She was noted to have right lower breast wound breakdown with exposed silicone implant and findings suggestive of implant rupture, she had additional superficial wounds on the left breast, deep right lateral abdominal wound and multiple wounds along her lower abdomen of various depths.  She was found to have areas of necrotic tissue which were cleaned out by wound care team.  She was instructed to pack wounds wet to dry and given the exposed right breast implant she was referred to me for further evaluation.      Patient states that in general she has a hard time following instructions.  She was told to avoid any strenuous activities after surgery, but but she and her family moved and she has been \"doing more than she should\". As mentioned above she has not been wearing compression garments.  All drains have been removed at this time.    Patient denies any pain at rest.  She notes discomfort with wound packing.  She has abdominal wound when strengthening her core and try to sit up.  Patient denies any fevers or chills.  No chest pain or shortness of breath.  Patient denies nausea vomiting and has good appetite.  She feels fatigued compared to her normal state. Denies any foul smell from the wounds, denies surrounding erythema or areas of fluctuance.  She notes clear drainage from her abdominal wounds.  She has been following her wound care instructions " given by the wound care clinic.    Patient states that in [place] she was instructed to have oxygen and hyperbaric treatment upon return to the .  She states she had a total of 3 hours of portable hyperbaric oxygen treatment in [place].     Patient smokes 1 pack/day.  During her preoperative evaluation with the surgeon in [place] she was recommended to stop smoking or at least decrease.  She states is very hard for her to cut back on smoking.  She had tried but was not successful.  She says they were aware of her active smoking status and actually surgeon saw her smoking outside right before her surgery on day of surgery.   Patient continues to smoke 1 pack/day.  She would like to stop smoking or at least decrease, but is not sure how to do it.    Patient has a history of heroin abuse but has been in recovery for the last 4 years.  She is on methadone maintenance.  She does not smoke marijuana but her  smokes THC daily in the house around her.     Patient says she has never had mammogram and was not asked to get one before surgery.  She denies family history of breast or ovarian cancer.  She states prior to surgery she has never noted any concerning breast masses, skin retraction or nipple drainage.    Patient's current weight is 265 pounds, BMI of 45.  She states she had struggled with her weight most of her life.  She initially lost almost 30 pounds after surgery, but had gained 20 pounds back.    Patient has a history of 6 pregnancies and 6 live birth via .  She also has a history of laparoscopic cholecystectomy.  She denies history of any abdominal pain or hernias.    She says she understands that these complications are due to her active smoking and not following her postoperative instructions.  She states she knows other people who had plastic surgery by the same surgeon in [place] and had good result.     She had talked to her surgeon in [place] and they would be willing to see her if she was  to go back to [place]. She would like to receive care here close to home as she does not have resources to go back to [place].     Patient is concerned about her aesthetic results.  She currently does not work.  Her  works periodically, but currently is also unemployed.    She is currently living with her , her 19-year-old daughter who has a baby, and her 2 youngest sons ages 8 and 10.  She states her  help her remove her drains, but has not been able to help her with her wound care.      Patient is currently taking doxycycline as prescribed.  Patient is also on Topamax and Seroquel, Voltaren, bupropion and cyclobenzaprine.    Patient states she has a history of hepatitis C, but was told it had cleared itself.  Due to history of drug abuse she has poor peripheral access and is always difficult to get IV access on her.    Patient denies any personal or family history of adverse anesthesia reactions.  Denies history of excessive bleeding or blood clots.  Denies history of excessive postoperative nausea.        Allergies: Codeine  Allergies Reconciled.    Review of Systems  All system were reviewed and were negative, except the ones noted above.     Past Medical History:   Diagnosis Date    Bipolar 1 disorder     Depression       Past Surgical History:   Procedure Laterality Date     SECTION      x 6    CHOLECYSTECTOMY      REPLACEMENT TOTAL HIP LATERAL POSITION        Family History   Problem Relation Age of Onset    Suicidality Father     Breast cancer Paternal Grandmother     Heart disease Paternal Grandmother      Social Status:  Ethnic Group: Not  Or   Race: White Or   Marital Status:    Employment status: Not Employed          Social History     Social History Narrative    Not on file      Tobacco Use: High Risk (2025)    Patient History     Smoking Tobacco Use: Every Day     Smokeless Tobacco Use: Unknown     Passive Exposure: Current        Objective  "    /74 (BP Location: Right arm, Patient Position: Sitting, Cuff Size: Large Adult)   Pulse 68   Temp 98.1 °F (36.7 °C) (Oral)   Ht 162.6 cm (64.02\")   Wt 119 kg (262 lb 12.8 oz)   SpO2 95%   BMI 45.09 kg/m²     Body mass index is 45.09 kg/m².    Physical Exam    General: pleasant woman in NAD  CV: RR  Lungs: Bilateral crackled, no wheezing, breathing comfortably on room air  Breast exam: Bilateral large breasts with good symmetry, extensive striae throughout both breasts, bilateral nipple areolar complex viable and in good position, right NAC slightly larger than the left, circumareolar incisions healing well bilaterally,  Right breast: cranial portion of vertical limb full thickness wound breakdown  measuring 2.5 cm long and 1.5 cm wide, it is full-thickness into the subcutaneous tissues but fairly superficial, several exposed loose sutures and dry scab noted, the scab and loose sutures were removed and there is healthy sub-q tissue at the base, right breast central IMF wound measuring 4.5 cm x 2.5 cm with exposed silicone implant and evidence of implant rupture without significant free silicone spillage, several prolene or PDS sub-q knots at the wound edges were removed, no surrounding erythema or areas of fluctuance, no odor, no drainage  Left breast: Superficial small areas of wound breakdown at the T-junction of the vertical limb just below the nipple areolar complex measuring 0.5 x 0.8 cm and at the T-junction along the IMF measuring 1 cm x 1 cm.  The wounds are superficial and into the dermis without extension to the subcutaneous layer, no surrounding erythema, areas of fluctuance or drainage.   Bilateral breast without any concerning masses.  Bilateral lateral chest soft tissue rolls.   SN-N: R 29 cm, L 27 cm  IMF-N: R 15 cm, L 15 cm   Breast width: R 20 cm, L 19 cm   N-N: 31 cm    Abdomen: Obese, reasonable contour, lower transverse abdominal incision with several areas of wound breakdown, the " largest is right lateral abdomen measuring 10 cm long by 3 cm wide and 3.5 cm deep, the wound base is clean with healthy subcutaneous tissue, no surrounding erythema drainage or fluctuance, the second largest wound is on the left lateral abdomen measuring 7 cm long and 1.5 cm wide, the wound is full-thickness into the subcutaneous tissues, several additional superficial wounds along the lower transverse abdominal incision primarily centrally, lateral aspects of the incisions with skin edges well-approximated, no signs of seroma, patient is minimally tender around the right lateral open wound but otherwise is nontender, umbilicus is midline and viable, circumumbilical incisions healing well  Extremities: No peripheral edema, no calf tenderness  Psych: Appropriate mood and affect, slightly anxious   Neuro: Grossly intact    Schnur Scale Score:  Body surface area is 2.2 meters squared.      Result Review :   The following data was reviewed by: Erlinda Fam MD on 04/17/2025:    I reviewed wound care clinic notes, notes from emergency room visit, multiple photos under media, lab work and culture results from April 7, 2024.    Hemoglobin level on April 7 was 9.6.  BMP was within normal limits.  Patient had preoperative EKG on March 4, 2025 showing sinus rhythm  Chest x-ray December 16, 2024 showed no concerning findings.      Procedure: Patient was taken to the procedure room and informed consent was reviewed and signed.  The timeout was performed.  With patient in supine position, the skin edges around the right breast wound were cleaned with alcohol wipe and injected with a total of 10 mL of 1% lidocaine with epinephrine.  This was allowed to take effect.  Once adequate anesthesia was ensured, the patient's right breast and upper abdomen were prepped with Betadine and draped in sterile fashion.  I then grasped the implant and was able to pull it out through the existing open wound without having to extend the  wound.  The implant rupture was at the site of wound breakdown and I ensured that I maintain the orientation of the implant to make sure there was no silicone spillage inside the breast cavity.  The implant and its contents were removed in its entirety and passed off the field to be sent to pathology for gross examination.  The implant was by [employer], , lot #[id number].  Right breast pocket was examined and there was no signs of infection.  I still went ahead and obtained swab culture from the right breast pocket and send it to microbiology.  The pocket was irrigated with sterile saline and packed with moistened Kerlix gauze.  The wound was cleaned and covered with ABD pad.  The patient tolerated the procedure well.              Assessment and Plan      Diagnoses and all orders for this visit:    1. Breast implant protrusion, initial encounter (Primary)    2. Wound drainage  -     Wound Culture - Wound, Breast, Right; Future  -     Wound Culture - Wound, Breast, Right    3. Disruption or dehiscence of closure of skin, initial encounter  -     Tissue Pathology Exam; Future  -     Tissue Pathology Exam    4. Wound of right breast, initial encounter    5. Abdominal wound dehiscence, initial encounter      Assessment and plan: Patient is a 40-year-old woman with morbid obesity, tobacco dependence status post bilateral augmentation mastopexy, liposuction with fat grafting to the buttock and abdominoplasty in [place] on March 21, 2025.  Recovery complicated by multiple wound breakdowns.  Of those the most significant ones are right breast wound along the IMF with exposed silicone implant and evidence of implant rupture without significant silicone spillage.  She has additional full-thickness wound on the vertical aspect of the right breast just below the nipple areolar complex.  She has no erythema, areas of fluctuance or drainage.  Left breast with 2 superficial small wounds at T-junction along the IMF and just below  the nipple areolar complex.  Overall she has reasonable breast symmetry and bilateral nipple areolar complex viable.     She has improved abdominal contour, she has multiple areas of wound breakdown along the lower abdominal transverse incision with largest on the right lateral abdomen.  This wound is fairly deep but does not extend all the way to the fascia, subcutaneous tissue is healthy, no signs of infection.  The second largest abdominal wound is on the left lateral abdomen, while this wound is measuring send 1 cm long and 1.5 cm wide it is through the skin into the subcutaneous tissues but is fairly superficial.  Several additional areas of superficial wound breakdown.  No signs of infection.    I discussed with the patient that the main concern is her right breast due to the size of the wound along the IMF as well as exposed implant.  Fortunately she does not have any signs of infection.  I explained to the patient that there is no way to salvage this implant especially given that there is a rupture.  I recommend implant removal and since there is no signs of infection this is something we can proceed with in a clinic procedure room under local.    I discussed with the patient details and possible complications of the procedure including bleeding, infection and pain.  She was told that once the implant is removed her right breast will be smaller than the left.  Part of her much smaller she will be depends on the size of the implant that was used for augmentation.  Due to chronicity of this wound and procedure being [place] outside of the country, I cannot close her wound and she will have to pack this wound to allow it to heal by secondary intention.     Patient expressed understanding and informed consent was signed.  Patient was therefore taken to the procedure room where right breast implant was removed (please see the above procedure for details).   While there were no signs of infection and patient has  been on antibiotics now for over a week, I still obtained a swab culture of the deep breast pocket and send it to microbiology for aerobic, anaerobic and fungal cultures.     I removed several superficial dry scabs from the right breast, superficial wounds of the left breast and along the lower abdomen.      The right breast pocket and right lateral deep abdominal wound was packed with wet-to-dry Kerlix gauze.  Remainder of the superficial wounds were covered with Vaseline and nonadherent pad.     With proper wound care, she should be able to heal her wounds within the next few weeks.  I strongly encourage smoking cessation.      Plan:  --Wound photos and measurements obtained today  -- Procedure: Right breast implant was removed today  -- Patient was not instructed to continue doxycycline as prescribed to finish the course she was given, given that her right breast implant is now removed and she has no signs of infection around any of her wounds I do not see an indication to continue antibiotics past that.  However I will defer this judgment to her wound care team and her primary care provider.  -- Patient was instructed to pack her right breast pocket and right lateral abdomen deep wound with wet-to-dry gauze and covered with ABD pad twice daily.  If she notes significant drainage and saturation of her ABD pads she can change ABD pads more frequently but she should not have to repack her wounds otherwise.  -- Patient was instructed to cover superficial wounds with Vaseline and nonadherent pads twice daily  -- She may shower normally and allow warm soapy water to run over the wounds, do not scrub the wounds and do not soak your wounds in the bathtub.  Pat incisions dry and redress it as usual.  Make sure you take out any dressing or packing prior to taking a shower.  -- I strongly encouraged the patient to follow activity and weight lifting instructions.  I explained that those instructions are given for reason and  will facilitate healing  -- I recommend patient wear abdominal binder to help with compression, this will help help decrease any soft tissue swelling, help keep dressings in place and likely decrease the drainage from her , open abdominal wounds which in turn will facilitate healing as well  -- Dress left breast superficial wounds and right vertical limb superficial wound with Vaseline and nonadherent pad.  If she has soft bra that is well-fitting I would recommend she wears it.  She needs to make sure she is fed in her IMF incisions really well to make sure the bra is not rubbing directly on her incisions.  This will help support her breasts and help her healing.  It will also help keep dressings in place.  --I removed several subcutaneous sutures and superficial dry scabs from her wounds and this should to help her heal  -- Most importantly of all the above interventions I strongly encouraged smoking cessation.  It is a difficult habit to break, but it is absolutely possible.  I recommend she reach out to her primary care provider and discuss possible treatments to help with smoking cessation.   -- I think when somebody smokes a pack a day even use of nicotine patches or gum at lower nicotine level would still be helpful as it could be tapered down over time if the patient is not able to completely stop smoking on her own.  -- I agree she would benefit from hyperbaric oxygen treatment, and I do not have any objections to it  -- Patient will continue to follow with wound care clinic for wound care management and ultimately I will defer to them wound care dressings.  -- Once patient heals, I strongly recommend patient obtain a mammogram.  I explained that in my practice given her age even without significant family history of breast cancer I would have had her obtain preoperative mammogram.  Since it was not done preoperatively I do recommend she obtain it after she heals.  -- Since her right breast implant was  removed her right breast is slightly smaller than the left.  At this time the size difference is not dramatic as it was fairly small implant.  However the difference is notable when out of clothing, but I do not think you will be readily noticeable when wearing the clothing.  Parted will depend how she heals once all the postoperative inflammation and swelling had subsided  -- Patient asked me if she could have repeat augmentation on the right breast.  I told her that it is something that can be considered but it would be at least a year from the time she heals her wound.  Because not only did she have an augmentation she also had an mastopexy and therefore neovascularization to her nipple areolar complex.  It is recommended to wait a year after the surgery to decrease the risk of partial or complete nipple loss.  Also even after implant removal she had fairly sizable breasts.  Therefore I am not sure she will need an implant in the future and again any kind of revision surgeries will have to be delayed and final assessment will be made pending her physical exam and overall health at that time.  -- Right now patient needs to focus on good nutrition, smoking cessation, adherence with wound care instructions, activity restrictions   -- I will follow-up microbiology results but I do not expect it will show anything.  Fungal cultures take several weeks to result.   -- Patient was instructed to follow-up with the wound care clinic for further wound care  --Follow-up with me as needed                Follow Up     No follow-ups on file.    Patient was given instructions and counseling regarding her condition. Please see specific information pulled into the AVS if appropriate.     Erlinda Fam MD  04/17/2025

## 2025-04-16 NOTE — PROGRESS NOTES
Wound Location: Right and Left Breast    Wound Exudate:moderate  Wound Debridement: Mechanically debrided with normal saline & gauze  Wound Thickness: full    Cleanse with:  Normal saline and gauze    Primary:  NS moist gauze to Right lower Breast & NS moist Aquacell ag to upper right and left breast  Secondary:  abd pad  Secured with: tape  María-wound: skin prep    Instructions: Cleanse wound with  Normal saline and gauze, apply NS moist gauze to right lower breast and NS moist Aquacell Ag to right and left upper breast wounds, cover with ABD and secure with tape.    Education: Educated patient/family member/CG on how to apply new dressings, verbal understanding provided. Patient/family member/CG instructed to call with any questions or concerns    Wound Location: Right lower abdomen    Wound Exudate: moderate  Wound Debridement: Mechanically debrided with normal saline & gauze  Wound Thickness: full     Cleanse with:  Normal saline or antibacterial soap and water    Primary: NS moist kerlix gauze roll  Secondary:  large abd pad  Secured with:  tape and pt brief  María-wound:  skin prep    Instructions:  Cleanse with NS or antibacterial soap, moisten kerlix roll with NS and squeeze out excess, cover with dry 4x4 and large ABD pad and secure with tape and brief.     Education: Educated patient/family member/CG on how to apply new dressings, verbal understanding provided. Patient/family member/CG instructed to call with any questions or concerns    Wound Location:  Right Medial and Lower Abdomen    Wound Exudate: moderate  Wound Debridement: Mechanically debrided with normal saline & gauze  Wound Thickness: full    Cleanse with:  NS or antibacterial soap and water, apply NS to aquacell ag and cover wound, then apply large abd and secure with tape    Primary: NS moist aquacell ag  Secondary: ABD pad  Secured with: tape and brief  María-wound: skin prep    Instructions: Cleanse wound with  Normal saline and gauze,  apply  NS moist Aquacell Ag to wounds, cover with ABD and secure with tape.      Education: Educated patient/family member/CG on how to apply new dressings, verbal understanding provided. Patient/family member/CG instructed to call with any questions or concerns

## 2025-04-17 ENCOUNTER — TELEPHONE (OUTPATIENT)
Dept: WOUND CARE | Facility: HOSPITAL | Age: 40
End: 2025-04-17
Payer: COMMERCIAL

## 2025-04-17 ENCOUNTER — OFFICE VISIT (OUTPATIENT)
Dept: PLASTIC SURGERY | Facility: CLINIC | Age: 40
End: 2025-04-17
Payer: COMMERCIAL

## 2025-04-17 ENCOUNTER — TELEPHONE (OUTPATIENT)
Dept: PLASTIC SURGERY | Facility: CLINIC | Age: 40
End: 2025-04-17

## 2025-04-17 VITALS
BODY MASS INDEX: 44.86 KG/M2 | SYSTOLIC BLOOD PRESSURE: 118 MMHG | DIASTOLIC BLOOD PRESSURE: 74 MMHG | WEIGHT: 262.8 LBS | TEMPERATURE: 98.1 F | HEART RATE: 68 BPM | HEIGHT: 64 IN | OXYGEN SATURATION: 95 %

## 2025-04-17 DIAGNOSIS — T81.321A ABDOMINAL WOUND DEHISCENCE, INITIAL ENCOUNTER: ICD-10-CM

## 2025-04-17 DIAGNOSIS — T85.49XA: Primary | ICD-10-CM

## 2025-04-17 DIAGNOSIS — T81.31XA DISRUPTION OR DEHISCENCE OF CLOSURE OF SKIN, INITIAL ENCOUNTER: ICD-10-CM

## 2025-04-17 DIAGNOSIS — S21.001A WOUND OF RIGHT BREAST, INITIAL ENCOUNTER: ICD-10-CM

## 2025-04-17 DIAGNOSIS — T14.8XXA WOUND DRAINAGE: ICD-10-CM

## 2025-04-17 PROCEDURE — 87205 SMEAR GRAM STAIN: CPT | Performed by: SURGERY

## 2025-04-17 PROCEDURE — 87070 CULTURE OTHR SPECIMN AEROBIC: CPT | Performed by: SURGERY

## 2025-04-17 NOTE — LETTER
April 18, 2025     LILY Leon  [address] [facility]  [address] [place] [id number]    Patient: Ana Weathers   YOB: 1985   Date of Visit: 4/17/2025     Dear LILY Leon:       Thank you for referring Ana Weathers to me for evaluation. Below are the relevant portions of my assessment and plan of care.    If you have questions, please do not hesitate to call me. I look forward to following Ana along with you.         Sincerely,        Erlinda Fam MD        CC: No Recipients    Erlinda Fam MD  04/18/25 [id number]  Sign when Signing Visit  =Chief Complaint  Consult (Complications s/p mommy makeover)    Subjective          History of Present Illness      Patient is a 40-year-old woman who is referred by Dr Haile for evaluation of multiple wounds.     Patient went to [place] and underwent a number of body contouring procedures on March 21, 2025 including bilateral augmentation mastopexy, abdominoplasty, liposuction and fat grafting to her buttock.   Patient states that when she went there for surgery she was found to have lung rattles and was treated with oral antibiotics for several days prior to undergoing the above-mentioned operation.  Patient states she was also planned for submental and neck liposuction as well as medial thigh liposuction, however she was told by surgeon he was not able to proceed with those additional procedures due to concern for the amount of blood loss.  Postoperatively patient was instructed to stay in [place] for 7 days after surgery however she flew back to [place] on postoperative day 2.  Since then patient removed her abdominal and breast drains with the help of her .  Per reports she removed them earlier than instructed.    Patient states she was given compression for her abdomen but has not been wearing it due to discomfort.  Patient was given compressive surgical bra but again she has not been wearing it as she found  "it uncomfortable.  Patient has not been following up her postoperative activity instructions.  Patient first noted wound breakdown at the bottom of her right breast as well as right lateral abdomen on March 31, 2025.  She was seen by her primary care provider and was started on Keflex given breast implants.   Patient progressively noted widening and separation of right lower breast wound, additional wounds in her left breast and additional wound breakdown along her lower transverse abdominal incision.  Patient was scheduled to follow-up with her primary care provider on April 7, however when they saw the appearance of her wounds she was instructed to proceed to the emergency room.  In the emergency room she was normotensive and afebrile.  She had no signs of infection.  White blood cell count was within normal limits.  She was switched to Doxycyclin given history of MRSA infection after right hip replacement in 2013.     Right breast wound culture was obtained and final results were negative.  White blood cell count was within normal limits.  Hemoglobin was 9.6.  Patient was then referred to wound care clinic where she was evaluated on April 15, 2025.  She was noted to have right lower breast wound breakdown with exposed silicone implant and findings suggestive of implant rupture, she had additional superficial wounds on the left breast, deep right lateral abdominal wound and multiple wounds along her lower abdomen of various depths.  She was found to have areas of necrotic tissue which were cleaned out by wound care team.  She was instructed to pack wounds wet to dry and given the exposed right breast implant she was referred to me for further evaluation.      Patient states that in general she has a hard time following instructions.  She was told to avoid any strenuous activities after surgery, but but she and her family moved and she has been \"doing more than she should\". As mentioned above she has not been " wearing compression garments.  All drains have been removed at this time.    Patient denies any pain at rest.  She notes discomfort with wound packing.  She has abdominal wound when strengthening her core and try to sit up.  Patient denies any fevers or chills.  No chest pain or shortness of breath.  Patient denies nausea vomiting and has good appetite.  She feels fatigued compared to her normal state. Denies any foul smell from the wounds, denies surrounding erythema or areas of fluctuance.  She notes clear drainage from her abdominal wounds.  She has been following her wound care instructions given by the wound care clinic.    Patient states that in [place] she was instructed to have oxygen and hyperbaric treatment upon return to the .  She states she had a total of 3 hours of portable hyperbaric oxygen treatment in [place].     Patient smokes 1 pack/day.  During her preoperative evaluation with the surgeon in [place] she was recommended to stop smoking or at least decrease.  She states is very hard for her to cut back on smoking.  She had tried but was not successful.  She says they were aware of her active smoking status and actually surgeon saw her smoking outside right before her surgery on day of surgery.   Patient continues to smoke 1 pack/day.  She would like to stop smoking or at least decrease, but is not sure how to do it.    Patient has a history of heroin abuse but has been in recovery for the last 4 years.  She is on methadone maintenance.  She does not smoke marijuana but her  smokes THC daily in the house around her.     Patient says she has never had mammogram and was not asked to get one before surgery.  She denies family history of breast or ovarian cancer.  She states prior to surgery she has never noted any concerning breast masses, skin retraction or nipple drainage.    Patient's current weight is 265 pounds, BMI of 45.  She states she had struggled with her weight most of her life.   She initially lost almost 30 pounds after surgery, but had gained 20 pounds back.    Patient has a history of 6 pregnancies and 6 live birth via .  She also has a history of laparoscopic cholecystectomy.  She denies history of any abdominal pain or hernias.    She says she understands that these complications are due to her active smoking and not following her postoperative instructions.  She states she knows other people who had plastic surgery by the same surgeon in [place] and had good result.     She had talked to her surgeon in [place] and they would be willing to see her if she was to go back to [place]. She would like to receive care here close to home as she does not have resources to go back to [place].     Patient is concerned about her aesthetic results.  She currently does not work.  Her  works periodically, but currently is also unemployed.    She is currently living with her , her 19-year-old daughter who has a baby, and her 2 youngest sons ages 8 and 10.  She states her  help her remove her drains, but has not been able to help her with her wound care.      Patient is currently taking doxycycline as prescribed.  Patient is also on Topamax and Seroquel, Voltaren, bupropion and cyclobenzaprine.    Patient states she has a history of hepatitis C, but was told it had cleared itself.  Due to history of drug abuse she has poor peripheral access and is always difficult to get IV access on her.    Patient denies any personal or family history of adverse anesthesia reactions.  Denies history of excessive bleeding or blood clots.  Denies history of excessive postoperative nausea.        Allergies: Codeine  Allergies Reconciled.    Review of Systems  All system were reviewed and were negative, except the ones noted above.     Past Medical History:   Diagnosis Date   • Bipolar 1 disorder    • Depression       Past Surgical History:   Procedure Laterality Date   •  SECTION      x  "6   • CHOLECYSTECTOMY     • REPLACEMENT TOTAL HIP LATERAL POSITION        Family History   Problem Relation Age of Onset   • Suicidality Father    • Breast cancer Paternal Grandmother    • Heart disease Paternal Grandmother      Social Status:  Ethnic Group: Not  Or   Race: White Or   Marital Status:    Employment status: Not Employed          Social History     Social History Narrative   • Not on file      Tobacco Use: High Risk (4/17/2025)    Patient History    • Smoking Tobacco Use: Every Day    • Smokeless Tobacco Use: Unknown    • Passive Exposure: Current        Objective     /74 (BP Location: Right arm, Patient Position: Sitting, Cuff Size: Large Adult)   Pulse 68   Temp 98.1 °F (36.7 °C) (Oral)   Ht 162.6 cm (64.02\")   Wt 119 kg (262 lb 12.8 oz)   SpO2 95%   BMI 45.09 kg/m²     Body mass index is 45.09 kg/m².    Physical Exam    General: pleasant woman in NAD  CV: RR  Lungs: Bilateral crackled, no wheezing, breathing comfortably on room air  Breast exam: Bilateral large breasts with good symmetry, extensive striae throughout both breasts, bilateral nipple areolar complex viable and in good position, right NAC slightly larger than the left, circumareolar incisions healing well bilaterally,  Right breast: cranial portion of vertical limb full thickness wound breakdown  measuring 2.5 cm long and 1.5 cm wide, it is full-thickness into the subcutaneous tissues but fairly superficial, several exposed loose sutures and dry scab noted, the scab and loose sutures were removed and there is healthy sub-q tissue at the base, right breast central IMF wound measuring 4.5 cm x 2.5 cm with exposed silicone implant and evidence of implant rupture without significant free silicone spillage, several prolene or PDS sub-q knots at the wound edges were removed, no surrounding erythema or areas of fluctuance, no odor, no drainage  Left breast: Superficial small areas of wound breakdown at " the T-junction of the vertical limb just below the nipple areolar complex measuring 0.5 x 0.8 cm and at the T-junction along the IMF measuring 1 cm x 1 cm.  The wounds are superficial and into the dermis without extension to the subcutaneous layer, no surrounding erythema, areas of fluctuance or drainage.   Bilateral breast without any concerning masses.  Bilateral lateral chest soft tissue rolls.   SN-N: R 29 cm, L 27 cm  IMF-N: R 15 cm, L 15 cm   Breast width: R 20 cm, L 19 cm   N-N: 31 cm    Abdomen: Obese, reasonable contour, lower transverse abdominal incision with several areas of wound breakdown, the largest is right lateral abdomen measuring 10 cm long by 3 cm wide and 3.5 cm deep, the wound base is clean with healthy subcutaneous tissue, no surrounding erythema drainage or fluctuance, the second largest wound is on the left lateral abdomen measuring 7 cm long and 1.5 cm wide, the wound is full-thickness into the subcutaneous tissues, several additional superficial wounds along the lower transverse abdominal incision primarily centrally, lateral aspects of the incisions with skin edges well-approximated, no signs of seroma, patient is minimally tender around the right lateral open wound but otherwise is nontender, umbilicus is midline and viable, circumumbilical incisions healing well  Extremities: No peripheral edema, no calf tenderness  Psych: Appropriate mood and affect, slightly anxious   Neuro: Grossly intact    Schnur Scale Score:  Body surface area is 2.2 meters squared.      Result Review :   The following data was reviewed by: Erlinda Fam MD on 04/17/2025:    I reviewed wound care clinic notes, notes from emergency room visit, multiple photos under media, lab work and culture results from April 7, 2024.    Hemoglobin level on April 7 was 9.6.  BMP was within normal limits.  Patient had preoperative EKG on March 4, 2025 showing sinus rhythm  Chest x-ray December 16, 2024 showed no concerning  findings.      Procedure: Patient was taken to the procedure room and informed consent was reviewed and signed.  The timeout was performed.  With patient in supine position, the skin edges around the right breast wound were cleaned with alcohol wipe and injected with a total of 10 mL of 1% lidocaine with epinephrine.  This was allowed to take effect.  Once adequate anesthesia was ensured, the patient's right breast and upper abdomen were prepped with Betadine and draped in sterile fashion.  I then grasped the implant and was able to pull it out through the existing open wound without having to extend the wound.  The implant rupture was at the site of wound breakdown and I ensured that I maintain the orientation of the implant to make sure there was no silicone spillage inside the breast cavity.  The implant and its contents were removed in its entirety and passed off the field to be sent to pathology for gross examination.  The implant was by [employer], , lot #[id number].  Right breast pocket was examined and there was no signs of infection.  I still went ahead and obtained swab culture from the right breast pocket and send it to microbiology.  The pocket was irrigated with sterile saline and packed with moistened Kerlix gauze.  The wound was cleaned and covered with ABD pad.  The patient tolerated the procedure well.              Assessment and Plan      Diagnoses and all orders for this visit:    1. Breast implant protrusion, initial encounter (Primary)    2. Wound drainage  -     Wound Culture - Wound, Breast, Right; Future  -     Wound Culture - Wound, Breast, Right    3. Disruption or dehiscence of closure of skin, initial encounter  -     Tissue Pathology Exam; Future  -     Tissue Pathology Exam    4. Wound of right breast, initial encounter    5. Abdominal wound dehiscence, initial encounter      Assessment and plan: Patient is a 40-year-old woman with morbid obesity, tobacco dependence status post  bilateral augmentation mastopexy, liposuction with fat grafting to the buttock and abdominoplasty in [place] on March 21, 2025.  Recovery complicated by multiple wound breakdowns.  Of those the most significant ones are right breast wound along the IMF with exposed silicone implant and evidence of implant rupture without significant silicone spillage.  She has additional full-thickness wound on the vertical aspect of the right breast just below the nipple areolar complex.  She has no erythema, areas of fluctuance or drainage.  Left breast with 2 superficial small wounds at T-junction along the IMF and just below the nipple areolar complex.  Overall she has reasonable breast symmetry and bilateral nipple areolar complex viable.     She has improved abdominal contour, she has multiple areas of wound breakdown along the lower abdominal transverse incision with largest on the right lateral abdomen.  This wound is fairly deep but does not extend all the way to the fascia, subcutaneous tissue is healthy, no signs of infection.  The second largest abdominal wound is on the left lateral abdomen, while this wound is measuring send 1 cm long and 1.5 cm wide it is through the skin into the subcutaneous tissues but is fairly superficial.  Several additional areas of superficial wound breakdown.  No signs of infection.    I discussed with the patient that the main concern is her right breast due to the size of the wound along the IMF as well as exposed implant.  Fortunately she does not have any signs of infection.  I explained to the patient that there is no way to salvage this implant especially given that there is a rupture.  I recommend implant removal and since there is no signs of infection this is something we can proceed with in a clinic procedure room under local.    I discussed with the patient details and possible complications of the procedure including bleeding, infection and pain.  She was told that once the implant  is removed her right breast will be smaller than the left.  Part of her much smaller she will be depends on the size of the implant that was used for augmentation.  Due to chronicity of this wound and procedure being [place] outside of the country, I cannot close her wound and she will have to pack this wound to allow it to heal by secondary intention.     Patient expressed understanding and informed consent was signed.  Patient was therefore taken to the procedure room where right breast implant was removed (please see the above procedure for details).   While there were no signs of infection and patient has been on antibiotics now for over a week, I still obtained a swab culture of the deep breast pocket and send it to microbiology for aerobic, anaerobic and fungal cultures.     I removed several superficial dry scabs from the right breast, superficial wounds of the left breast and along the lower abdomen.      The right breast pocket and right lateral deep abdominal wound was packed with wet-to-dry Kerlix gauze.  Remainder of the superficial wounds were covered with Vaseline and nonadherent pad.     With proper wound care, she should be able to heal her wounds within the next few weeks.  I strongly encourage smoking cessation.      Plan:  --Wound photos and measurements obtained today  -- Procedure: Right breast implant was removed today  -- Patient was not instructed to continue doxycycline as prescribed to finish the course she was given, given that her right breast implant is now removed and she has no signs of infection around any of her wounds I do not see an indication to continue antibiotics past that.  However I will defer this judgment to her wound care team and her primary care provider.  -- Patient was instructed to pack her right breast pocket and right lateral abdomen deep wound with wet-to-dry gauze and covered with ABD pad twice daily.  If she notes significant drainage and saturation of her ABD  pads she can change ABD pads more frequently but she should not have to repack her wounds otherwise.  -- Patient was instructed to cover superficial wounds with Vaseline and nonadherent pads twice daily  -- She may shower normally and allow warm soapy water to run over the wounds, do not scrub the wounds and do not soak your wounds in the bathtub.  Pat incisions dry and redress it as usual.  Make sure you take out any dressing or packing prior to taking a shower.  -- I strongly encouraged the patient to follow activity and weight lifting instructions.  I explained that those instructions are given for reason and will facilitate healing  -- I recommend patient wear abdominal binder to help with compression, this will help help decrease any soft tissue swelling, help keep dressings in place and likely decrease the drainage from her , open abdominal wounds which in turn will facilitate healing as well  -- Dress left breast superficial wounds and right vertical limb superficial wound with Vaseline and nonadherent pad.  If she has soft bra that is well-fitting I would recommend she wears it.  She needs to make sure she is fed in her IMF incisions really well to make sure the bra is not rubbing directly on her incisions.  This will help support her breasts and help her healing.  It will also help keep dressings in place.  --I removed several subcutaneous sutures and superficial dry scabs from her wounds and this should to help her heal  -- Most importantly of all the above interventions I strongly encouraged smoking cessation.  It is a difficult habit to break, but it is absolutely possible.  I recommend she reach out to her primary care provider and discuss possible treatments to help with smoking cessation.   -- I think when somebody smokes a pack a day even use of nicotine patches or gum at lower nicotine level would still be helpful as it could be tapered down over time if the patient is not able to completely stop  smoking on her own.  -- I agree she would benefit from hyperbaric oxygen treatment, and I do not have any objections to it  -- Patient will continue to follow with wound care clinic for wound care management and ultimately I will defer to them wound care dressings.  -- Once patient heals, I strongly recommend patient obtain a mammogram.  I explained that in my practice given her age even without significant family history of breast cancer I would have had her obtain preoperative mammogram.  Since it was not done preoperatively I do recommend she obtain it after she heals.  -- Since her right breast implant was removed her right breast is slightly smaller than the left.  At this time the size difference is not dramatic as it was fairly small implant.  However the difference is notable when out of clothing, but I do not think you will be readily noticeable when wearing the clothing.  Parted will depend how she heals once all the postoperative inflammation and swelling had subsided  -- Patient asked me if she could have repeat augmentation on the right breast.  I told her that it is something that can be considered but it would be at least a year from the time she heals her wound.  Because not only did she have an augmentation she also had an mastopexy and therefore neovascularization to her nipple areolar complex.  It is recommended to wait a year after the surgery to decrease the risk of partial or complete nipple loss.  Also even after implant removal she had fairly sizable breasts.  Therefore I am not sure she will need an implant in the future and again any kind of revision surgeries will have to be delayed and final assessment will be made pending her physical exam and overall health at that time.  -- Right now patient needs to focus on good nutrition, smoking cessation, adherence with wound care instructions, activity restrictions   -- I will follow-up microbiology results but I do not expect it will show  anything.  Fungal cultures take several weeks to result.   -- Patient was instructed to follow-up with the wound care clinic for further wound care  --Follow-up with me as needed                Follow Up     No follow-ups on file.    Patient was given instructions and counseling regarding her condition. Please see specific information pulled into the AVS if appropriate.     Erlinda Fam MD  04/17/2025

## 2025-04-17 NOTE — TELEPHONE ENCOUNTER
Hub staff attempted to follow warm transfer process and was unsuccessful     Caller: ISMAEL    Relationship to patient: Caregiver (non-relative)    Best call back number: [phone]     Patient is needing: WOUND CARE CENTER CALLING AS CONCERNED AS TODAY'S VISIT NOTES ARE INCONSISTENT WITH PT'S HISTORY OF THE WOUND

## 2025-04-17 NOTE — TELEPHONE ENCOUNTER
Left message at Plastic Surgeon office [phone] to return call regarding office visit 4/17/25 dictation note.

## 2025-04-17 NOTE — LETTER
April 18, 2025     LILY Leon  [address] [facility]  [address] [place] [id number]    Patient: Ana Weathers   YOB: 1985   Date of Visit: 4/17/2025     Dear LILY Leon:       Thank you for referring Ana Weathers to me for evaluation. Below are the relevant portions of my assessment and plan of care.    If you have questions, please do not hesitate to call me. I look forward to following Ana along with you.         Sincerely,        Erlinda Fam MD        CC: No Recipients    Erlinda Fam MD  04/18/25 [id number]  Sign when Signing Visit  =Chief Complaint  Consult (Complications s/p mommy makeover)    Subjective     {Problem List  Visit Diagnosis   Encounters  Notes  Medications  Labs  Result Review Imaging  Media :23}     History of Present Illness      Patient is a 40-year-old woman who is referred by Dr Haile for evaluation of multiple wounds.     Patient went to [place] and underwent a number of body contouring procedures on March 21, 2025 including bilateral augmentation mastopexy, abdominoplasty, liposuction and fat grafting to her buttock.   Patient states that when she went there for surgery she was found to have lung rattles and was treated with oral antibiotics for several days prior to undergoing the above-mentioned operation.  Patient states she was also planned for submental and neck liposuction as well as medial thigh liposuction, however she was told by surgeon he was not able to proceed with those additional procedures due to concern for the amount of blood loss.  Postoperatively patient was instructed to stay in [place] for 7 days after surgery however she flew back to [place] on postoperative day 2.  Since then patient removed her abdominal and breast drains with the help of her .  Per reports she removed them earlier than instructed.    Patient states she was given compression for her abdomen but has not been wearing it due  to discomfort.  Patient was given compressive surgical bra but again she has not been wearing it as she found it uncomfortable.  Patient has not been following up her postoperative activity instructions.  Patient first noted wound breakdown at the bottom of her right breast as well as right lateral abdomen on March 31, 2025.  She was seen by her primary care provider and was started on Keflex given breast implants.   Patient progressively noted widening and separation of right lower breast wound, additional wounds in her left breast and additional wound breakdown along her lower transverse abdominal incision.  Patient was scheduled to follow-up with her primary care provider on April 7, however when they saw the appearance of her wounds she was instructed to proceed to the emergency room.  In the emergency room she was normotensive and afebrile.  She had no signs of infection.  White blood cell count was within normal limits.  She was switched to Doxycyclin given history of MRSA infection after right hip replacement in 2013.     Right breast wound culture was obtained and final results were negative.  White blood cell count was within normal limits.  Hemoglobin was 9.6.  Patient was then referred to wound care clinic where she was evaluated on April 15, 2025.  She was noted to have right lower breast wound breakdown with exposed silicone implant and findings suggestive of implant rupture, she had additional superficial wounds on the left breast, deep right lateral abdominal wound and multiple wounds along her lower abdomen of various depths.  She was found to have areas of necrotic tissue which were cleaned out by wound care team.  She was instructed to pack wounds wet to dry and given the exposed right breast implant she was referred to me for further evaluation.      Patient states that in general she has a hard time following instructions.  She was told to avoid any strenuous activities after surgery, but but she  "and her family moved and she has been \"doing more than she should\". As mentioned above she has not been wearing compression garments.  All drains have been removed at this time.    Patient denies any pain at rest.  She notes discomfort with wound packing.  She has abdominal wound when strengthening her core and try to sit up.  Patient denies any fevers or chills.  No chest pain or shortness of breath.  Patient denies nausea vomiting and has good appetite.  She feels fatigued compared to her normal state. Denies any foul smell from the wounds, denies surrounding erythema or areas of fluctuance.  She notes clear drainage from her abdominal wounds.  She has been following her wound care instructions given by the wound care clinic.    Patient states that in [place] she was instructed to have oxygen and hyperbaric treatment upon return to the .  She states she had a total of 3 hours of portable hyperbaric oxygen treatment in [place].     Patient smokes 1 pack/day.  During her preoperative evaluation with the surgeon in [place] she was recommended to stop smoking or at least decrease.  She states is very hard for her to cut back on smoking.  She had tried but was not successful.  She says they were aware of her active smoking status and actually surgeon saw her smoking outside right before her surgery on day of surgery.   Patient continues to smoke 1 pack/day.  She would like to stop smoking or at least decrease, but is not sure how to do it.    Patient has a history of heroin abuse but has been in recovery for the last 4 years.  She is on methadone maintenance.  She does not smoke marijuana but her  smokes THC daily in the house around her.     Patient says she has never had mammogram and was not asked to get one before surgery.  She denies family history of breast or ovarian cancer.  She states prior to surgery she has never noted any concerning breast masses, skin retraction or nipple drainage.    Patient's " current weight is 265 pounds, BMI of 45.  She states she had struggled with her weight most of her life.  She initially lost almost 30 pounds after surgery, but had gained 20 pounds back.    Patient has a history of 6 pregnancies and 6 live birth via .  She also has a history of laparoscopic cholecystectomy.  She denies history of any abdominal pain or hernias.    She says she understands that these complications are due to her active smoking and not following her postoperative instructions.  She states she knows other people who had plastic surgery by the same surgeon in [place] and had good result.     She had talked to her surgeon in [place] and they would be willing to see her if she was to go back to [place]. She would like to receive care here close to home as she does not have resources to go back to [place].     Patient is concerned about her aesthetic results.  She currently does not work.  Her  works periodically, but currently is also unemployed.    She is currently living with her , her 19-year-old daughter who has a baby, and her 2 youngest sons ages 8 and 10.  She states her  help her remove her drains, but has not been able to help her with her wound care.      Patient is currently taking doxycycline as prescribed.  Patient is also on Topamax and Seroquel, Voltaren, bupropion and cyclobenzaprine.    Patient states she has a history of hepatitis C, but was told it had cleared itself.  Due to history of drug abuse she has poor peripheral access and is always difficult to get IV access on her.    Patient denies any personal or family history of adverse anesthesia reactions.  Denies history of excessive bleeding or blood clots.  Denies history of excessive postoperative nausea.        Allergies: Codeine  Allergies Reconciled.    Review of Systems  All system were reviewed and were negative, except the ones noted above.     Past Medical History:   Diagnosis Date   • Bipolar 1  "disorder    • Depression       Past Surgical History:   Procedure Laterality Date   •  SECTION      x 6   • CHOLECYSTECTOMY     • REPLACEMENT TOTAL HIP LATERAL POSITION        Family History   Problem Relation Age of Onset   • Suicidality Father    • Breast cancer Paternal Grandmother    • Heart disease Paternal Grandmother      Social Status:  Ethnic Group: Not  Or   Race: White Or   Marital Status:    Employment status: Not Employed          Social History     Social History Narrative   • Not on file      Tobacco Use: High Risk (2025)    Patient History    • Smoking Tobacco Use: Every Day    • Smokeless Tobacco Use: Unknown    • Passive Exposure: Current        Objective     /74 (BP Location: Right arm, Patient Position: Sitting, Cuff Size: Large Adult)   Pulse 68   Temp 98.1 °F (36.7 °C) (Oral)   Ht 162.6 cm (64.02\")   Wt 119 kg (262 lb 12.8 oz)   SpO2 95%   BMI 45.09 kg/m²     Body mass index is 45.09 kg/m².    Physical Exam    General: pleasant woman in NAD  CV: RR  Lungs: Bilateral crackled, no wheezing, breathing comfortably on room air  Breast exam: Bilateral large breasts with good symmetry, extensive striae throughout both breasts, bilateral nipple areolar complex viable and in good position, right NAC slightly larger than the left, circumareolar incisions healing well bilaterally,  Right breast: cranial portion of vertical limb full thickness wound breakdown  measuring 2.5 cm long and 1.5 cm wide, it is full-thickness into the subcutaneous tissues but fairly superficial, several exposed loose sutures and dry scab noted, the scab and loose sutures were removed and there is healthy sub-q tissue at the base, right breast central IMF wound measuring 4.5 cm x 2.5 cm with exposed silicone implant and evidence of implant rupture without significant free silicone spillage, several prolene or PDS sub-q knots at the wound edges were removed, no surrounding " erythema or areas of fluctuance, no odor, no drainage  Left breast: Superficial small areas of wound breakdown at the T-junction of the vertical limb just below the nipple areolar complex measuring 0.5 x 0.8 cm and at the T-junction along the IMF measuring 1 cm x 1 cm.  The wounds are superficial and into the dermis without extension to the subcutaneous layer, no surrounding erythema, areas of fluctuance or drainage.   Bilateral breast without any concerning masses.  Bilateral lateral chest soft tissue rolls.   SN-N: R 29 cm, L 27 cm  IMF-N: R 15 cm, L 15 cm   Breast width: R 20 cm, L 19 cm   N-N: 31 cm    Abdomen: Obese, reasonable contour, lower transverse abdominal incision with several areas of wound breakdown, the largest is right lateral abdomen measuring 10 cm long by 3 cm wide and 3.5 cm deep, the wound base is clean with healthy subcutaneous tissue, no surrounding erythema drainage or fluctuance, the second largest wound is on the left lateral abdomen measuring 7 cm long and 1.5 cm wide, the wound is full-thickness into the subcutaneous tissues, several additional superficial wounds along the lower transverse abdominal incision primarily centrally, lateral aspects of the incisions with skin edges well-approximated, no signs of seroma, patient is minimally tender around the right lateral open wound but otherwise is nontender, umbilicus is midline and viable, circumumbilical incisions healing well  Extremities: No peripheral edema, no calf tenderness  Psych: Appropriate mood and affect, slightly anxious   Neuro: Grossly intact    Schnur Scale Score:  Body surface area is 2.2 meters squared.      Result Review :{Labs  Result Review  Imaging  Med Tab  Media :23}   The following data was reviewed by: Erlinda Fam MD on 04/17/2025:    I reviewed wound care clinic notes, notes from emergency room visit, multiple photos under media, lab work and culture results from April 7, 2024.    Hemoglobin level on  April 7 was 9.6.  BMP was within normal limits.  Patient had preoperative EKG on March 4, 2025 showing sinus rhythm  Chest x-ray December 16, 2024 showed no concerning findings.      Procedure: Patient was taken to the procedure room and informed consent was reviewed and signed.  The timeout was performed.  With patient in supine position, the skin edges around the right breast wound were cleaned with alcohol wipe and injected with a total of 10 mL of 1% lidocaine with epinephrine.  This was allowed to take effect.  Once adequate anesthesia was ensured, the patient's right breast and upper abdomen were prepped with Betadine and draped in sterile fashion.  I then grasped the implant and was able to pull it out through the existing open wound without having to extend the wound.  The implant rupture was at the site of wound breakdown and I ensured that I maintain the orientation of the implant to make sure there was no silicone spillage inside the breast cavity.  The implant and its contents were removed in its entirety and passed off the field to be sent to pathology for gross examination.  The implant was by [employer], , lot #[id number].  Right breast pocket was examined and there was no signs of infection.  I still went ahead and obtained swab culture from the right breast pocket and send it to microbiology.  The pocket was irrigated with sterile saline and packed with moistened Kerlix gauze.  The wound was cleaned and covered with ABD pad.  The patient tolerated the procedure well.              Assessment and Plan {CC Problem List  Visit Diagnosis  ROS  Review (Popup)  Health Maintenance  Quality  BestPractice  Medications  SmartSets  SnapShot Encounters  Media :23}     Diagnoses and all orders for this visit:    1. Breast implant protrusion, initial encounter (Primary)    2. Wound drainage  -     Wound Culture - Wound, Breast, Right; Future  -     Wound Culture - Wound, Breast, Right    3.  Disruption or dehiscence of closure of skin, initial encounter  -     Tissue Pathology Exam; Future  -     Tissue Pathology Exam    4. Wound of right breast, initial encounter    5. Abdominal wound dehiscence, initial encounter      Assessment and plan: Patient is a 40-year-old woman with morbid obesity, tobacco dependence status post bilateral augmentation mastopexy, liposuction with fat grafting to the buttock and abdominoplasty in [place] on March 21, 2025.  Recovery complicated by multiple wound breakdowns.  Of those the most significant ones are right breast wound along the IMF with exposed silicone implant and evidence of implant rupture without significant silicone spillage.  She has additional full-thickness wound on the vertical aspect of the right breast just below the nipple areolar complex.  She has no erythema, areas of fluctuance or drainage.  Left breast with 2 superficial small wounds at T-junction along the IMF and just below the nipple areolar complex.  Overall she has reasonable breast symmetry and bilateral nipple areolar complex viable.     She has improved abdominal contour, she has multiple areas of wound breakdown along the lower abdominal transverse incision with largest on the right lateral abdomen.  This wound is fairly deep but does not extend all the way to the fascia, subcutaneous tissue is healthy, no signs of infection.  The second largest abdominal wound is on the left lateral abdomen, while this wound is measuring send 1 cm long and 1.5 cm wide it is through the skin into the subcutaneous tissues but is fairly superficial.  Several additional areas of superficial wound breakdown.  No signs of infection.    I discussed with the patient that the main concern is her right breast due to the size of the wound along the IMF as well as exposed implant.  Fortunately she does not have any signs of infection.  I explained to the patient that there is no way to salvage this implant especially  given that there is a rupture.  I recommend implant removal and since there is no signs of infection this is something we can proceed with in a clinic procedure room under local.    I discussed with the patient details and possible complications of the procedure including bleeding, infection and pain.  She was told that once the implant is removed her right breast will be smaller than the left.  Part of her much smaller she will be depends on the size of the implant that was used for augmentation.  Due to chronicity of this wound and procedure [place] of the [place], I cannot close her wound and she will have to pack this wound to allow it to heal by secondary intention.     Patient expressed understanding and informed consent was signed.  Patient was therefore taken to the procedure room where right breast implant was removed (please see the above procedure for details).   While there were no signs of infection and patient has been on antibiotics now for over a week, I still obtained a swab culture of the deep breast pocket and send it to microbiology for aerobic, anaerobic and fungal cultures.     I removed several superficial dry scabs from the right breast, superficial wounds of the left breast and along the lower abdomen.      The right breast pocket and right lateral deep abdominal wound was packed with wet-to-dry Kerlix gauze.  Remainder of the superficial wounds were covered with Vaseline and nonadherent pad.     With proper wound care, she should be able to heal her wounds within the next few weeks.  I strongly encourage smoking cessation.      Plan:  --Wound photos and measurements obtained today  -- Procedure: Right breast implant was removed today  -- Patient was not instructed to continue doxycycline as prescribed to finish the course she was given, given that her right breast implant is now removed and she has no signs of infection around any of her wounds I do not see an indication to  continue antibiotics past that.  However I will defer this judgment to her wound care team and her primary care provider.  -- Patient was instructed to pack her right breast pocket and right lateral abdomen deep wound with wet-to-dry gauze and covered with ABD pad twice daily.  If she notes significant drainage and saturation of her ABD pads she can change ABD pads more frequently but she should not have to repack her wounds otherwise.  -- Patient was instructed to cover superficial wounds with Vaseline and nonadherent pads twice daily  -- She may shower normally and allow warm soapy water to run over the wounds, do not scrub the wounds and do not soak your wounds in the bathtub.  Pat incisions dry and redress it as usual.  Make sure you take out any dressing or packing prior to taking a shower.  -- I strongly encouraged the patient to follow activity and weight lifting instructions.  I explained that those instructions are given for reason and will facilitate healing  -- I recommend patient wear abdominal binder to help with compression, this will help help decrease any soft tissue swelling, help keep dressings in place and likely decrease the drainage from her , open abdominal wounds which in turn will facilitate healing as well  -- Dress left breast superficial wounds and right vertical limb superficial wound with Vaseline and nonadherent pad.  If she has soft bra that is well-fitting I would recommend she wears it.  She needs to make sure she is fed in her IMF incisions really well to make sure the bra is not rubbing directly on her incisions.  This will help support her breasts and help her healing.  It will also help keep dressings in place.  --I removed several subcutaneous sutures and superficial dry scabs from her wounds and this should to help her heal  -- Most importantly of all the above interventions I strongly encouraged smoking cessation.  It is a difficult habit to break, but it is absolutely  possible.  I recommend she reach out to her primary care provider and discuss possible treatments to help with smoking cessation.   -- I think when somebody smokes a pack a day even use of nicotine patches or gum at lower nicotine level would still be helpful as it could be tapered down over time if the patient is not able to completely stop smoking on her own.  -- I agree she would benefit from hyperbaric oxygen treatment, and I do not have any objections to it  -- Patient will continue to follow with wound care clinic for wound care management and ultimately I will defer to them wound care dressings.  -- Once patient heals, I strongly recommend patient obtain a mammogram.  I explained that in my practice given her age even without significant family history of breast cancer I would have had her obtain preoperative mammogram.  Since it was not done preoperatively I do recommend she obtain it after she heals.  -- Since her right breast implant was removed her right breast is slightly smaller than the left.  At this time the size difference is not dramatic as it was fairly small implant.  However the difference is notable when out of clothing, but I do not think you will be readily noticeable when wearing the clothing.  Parted will depend how she heals once all the postoperative inflammation and swelling had subsided  -- Patient asked me if she could have repeat augmentation on the right breast.  I told her that it is something that can be considered but it would be at least a year from the time she heals her wound.  Because not only did she have an augmentation she also had an mastopexy and therefore neovascularization to her nipple areolar complex.  It is recommended to wait a year after the surgery to decrease the risk of partial or complete nipple loss.  Also even after implant removal she had fairly sizable breasts.  Therefore I am not sure she will need an implant in the future and again any kind of revision  surgeries will have to be delayed and final assessment will be made pending her physical exam and overall health at that time.  -- Right now patient needs to focus on good nutrition, smoking cessation, adherence with wound care instructions, activity restrictions   -- I will follow-up microbiology results but I do not expect it will show anything.  Fungal cultures take several weeks to result.   -- Patient was instructed to follow-up with the wound care clinic for further wound care  --Follow-up with me as needed                Follow Up {Instructions Charge Capture  Follow-up Communications :23}    No follow-ups on file.    Patient was given instructions and counseling regarding her condition. Please see specific information pulled into the AVS if appropriate.     Erlinda Fam MD  04/17/2025

## 2025-04-18 ENCOUNTER — TELEPHONE (OUTPATIENT)
Dept: WOUND CARE | Facility: HOSPITAL | Age: 40
End: 2025-04-18
Payer: COMMERCIAL

## 2025-04-18 LAB
LAB AP CASE REPORT: NORMAL
LAB AP CLINICAL INFORMATION: NORMAL
PATH REPORT.FINAL DX SPEC: NORMAL
PATH REPORT.GROSS SPEC: NORMAL

## 2025-04-18 NOTE — TELEPHONE ENCOUNTER
Pt called wanting to get on the schedule for her dressing changes today (11/18/25).     No openings available    Offered to place her on the schedule for next week, starting on Monday (4/21/25).     Pt hung up the phone. Unable to schedule future appointments with pt.

## 2025-04-20 LAB
BACTERIA SPEC AEROBE CULT: NORMAL
GRAM STN SPEC: NORMAL
GRAM STN SPEC: NORMAL

## 2025-04-21 ENCOUNTER — TELEPHONE (OUTPATIENT)
Dept: WOUND CARE | Facility: HOSPITAL | Age: 40
End: 2025-04-21
Payer: COMMERCIAL

## 2025-04-21 NOTE — TELEPHONE ENCOUNTER
Spoke with pt and informed of option to go to [facility] daily for wound care if she is unable to get her boyfriend to perform wound care.  Voiced understanding and stated she would only do if she can't do friend/family to perform.  Faxed order to [phone].

## 2025-04-21 NOTE — TELEPHONE ENCOUNTER
Spoke with pt and informed of option to go to [facility] daily for wound care if she is unable to get her boyfriend to perform wound care.  Voiced understanding and stated she would only do if she can't have friend/family to perform.     Sharron- Can you enter wound care orders to be faxed to [facility] [phone]?

## 2025-04-22 ENCOUNTER — TELEPHONE (OUTPATIENT)
Dept: WOUND CARE | Facility: HOSPITAL | Age: 40
End: 2025-04-22
Payer: COMMERCIAL

## 2025-04-22 DIAGNOSIS — Z98.82 S/P BREAST AUGMENTATION: ICD-10-CM

## 2025-04-22 DIAGNOSIS — S31.109A OPEN WOUND OF ABDOMEN, INITIAL ENCOUNTER: ICD-10-CM

## 2025-04-22 DIAGNOSIS — Z98.890 S/P ABDOMINOPLASTY: ICD-10-CM

## 2025-04-22 DIAGNOSIS — T86.821 FAILED FLAP: Primary | ICD-10-CM

## 2025-04-22 DIAGNOSIS — S21.001A OPEN WOUND OF RIGHT FEMALE BREAST, INITIAL ENCOUNTER: ICD-10-CM

## 2025-04-22 DIAGNOSIS — T81.31XA SURGICAL WOUND DEHISCENCE, INITIAL ENCOUNTER: ICD-10-CM

## 2025-04-22 DIAGNOSIS — T85.43XA RUPTURED SILICONE BREAST IMPLANT, INITIAL ENCOUNTER: ICD-10-CM

## 2025-04-23 ENCOUNTER — OFFICE VISIT (OUTPATIENT)
Dept: WOUND CARE | Facility: HOSPITAL | Age: 40
End: 2025-04-23
Payer: COMMERCIAL

## 2025-04-23 VITALS
HEART RATE: 76 BPM | RESPIRATION RATE: 18 BRPM | SYSTOLIC BLOOD PRESSURE: 116 MMHG | TEMPERATURE: 101.1 F | DIASTOLIC BLOOD PRESSURE: 56 MMHG

## 2025-04-23 DIAGNOSIS — S21.002D OPEN WOUND OF LEFT BREAST, SUBSEQUENT ENCOUNTER: ICD-10-CM

## 2025-04-23 DIAGNOSIS — T81.31XD POSTOPERATIVE WOUND DEHISCENCE, SUBSEQUENT ENCOUNTER: ICD-10-CM

## 2025-04-23 DIAGNOSIS — Z98.890 S/P ABDOMINOPLASTY: ICD-10-CM

## 2025-04-23 DIAGNOSIS — Z98.82 S/P BREAST AUGMENTATION: ICD-10-CM

## 2025-04-23 DIAGNOSIS — T86.821 FAILED FLAP: Primary | ICD-10-CM

## 2025-04-23 DIAGNOSIS — S21.001D OPEN WOUND OF RIGHT FEMALE BREAST, SUBSEQUENT ENCOUNTER: ICD-10-CM

## 2025-04-23 DIAGNOSIS — E66.01 CLASS 3 SEVERE OBESITY WITH BODY MASS INDEX (BMI) OF 45.0 TO 49.9 IN ADULT, UNSPECIFIED OBESITY TYPE, UNSPECIFIED WHETHER SERIOUS COMORBIDITY PRESENT: ICD-10-CM

## 2025-04-23 DIAGNOSIS — E66.813 CLASS 3 SEVERE OBESITY WITH BODY MASS INDEX (BMI) OF 45.0 TO 49.9 IN ADULT, UNSPECIFIED OBESITY TYPE, UNSPECIFIED WHETHER SERIOUS COMORBIDITY PRESENT: ICD-10-CM

## 2025-04-23 DIAGNOSIS — F17.200 NICOTINE DEPENDENCE WITH CURRENT USE: ICD-10-CM

## 2025-04-23 DIAGNOSIS — S31.109D OPEN WOUND OF ABDOMEN, SUBSEQUENT ENCOUNTER: ICD-10-CM

## 2025-04-23 PROCEDURE — 1160F RVW MEDS BY RX/DR IN RCRD: CPT | Performed by: NURSE PRACTITIONER

## 2025-04-23 PROCEDURE — 1159F MED LIST DOCD IN RCRD: CPT | Performed by: NURSE PRACTITIONER

## 2025-04-23 PROCEDURE — 97606 NEG PRS WND THER DME>50 SQCM: CPT | Performed by: NURSE PRACTITIONER

## 2025-04-23 NOTE — LETTER
April 23, 2025     Patient: Ana Weathers   YOB: 1985   Date of Visit: 4/23/2025       To Whom it May Concern:    Ana Weathers was seen in my clinic on 04/15/2025 and 4/23/2025. She is able to participate in her classes, however may require special considerations and accommodations due to frequent medical appointments and a complicated recovery process, resulting in decreased strength and endurance.    If you have any questions or concerns, please don't hesitate to call.         Sincerely,          LILY Leon        CC: No Recipients

## 2025-04-23 NOTE — TELEPHONE ENCOUNTER
Call placed to patient to discuss message sent via My Chart. Reviewed current symptoms and complaints. Patient due to be seen in clinic tomorrow. Will do full re-evaluation tomorrow and consider alternative treatment plans to assist with pain control, facilitate compliance with dressing changes and to promote healing while preventing further deterioration.   Patient in agreement with plan for tomorrows visit.   Sharron Piedra, APRN

## 2025-04-23 NOTE — PROGRESS NOTES
Chief Complaint  Wound Check (Follow-up visit for multiple wounds. )    Subjective      History of Present Illness    Ana Weathers  is a 40 y.o. female     History of Present Illness  The patient presents today for a follow-up of multiple wounds following extensive plastic surgery. She is s/p Abdominoplasty, breast augmentation and BBL performed in [place] on 03/21/2025    After her initial appointment, she was referred to Plastic Surgery for removal of a compromised implant of the right breast. She was seen and evaluated on 04/17/2025. In addition to the implant within the right breast, they removed some additional exposed suturing material and some non-viable tissue.    She has been experiencing persistent tearing sensations in her abdominal region and is having increased pain now that the numbing sensation has worn off.   She has not been able to wear the compression garments provided after surgery and therefore has been attempting to apply compression to her breast and abdomen  with an Ace bandage, which she reports as the only method that allows her to sleep comfortably.     She has observed a decrease in the amount of gauze required for each dressing change, indicating a reduction in wound drainage. She and her family / friends have been attempting to pack her right breast wound and abdominal wound 1 - 2 x daily with NS moistened gauze. They have also been applying Aquacel AG and a dressing to the other wounds. She has been having difficulties securing adequate assistance and is eager to look into alternative treatment options.    She has expressed interest in hyperbaric oxygen therapy as a potential treatment option.     She does admit to continued tobacco use but states that she is attempting to quit and is down to approximately 3/4 ppd from 1 ppd at her last appointment. She reports that she will be seeing her PCP on Friday to seek assistance with smoking cessation.      Allergies:  Codeine      Current  Outpatient Medications:     buPROPion SR (WELLBUTRIN SR) 150 MG 12 hr tablet, Take  by mouth 2 (Two) Times a Day. (Patient not taking: Reported on 2025), Disp: , Rfl:     cyclobenzaprine (FLEXERIL) 10 MG tablet, Take 1 tablet by mouth 3 (Three) Times a Day As Needed for Muscle Spasms. (Patient not taking: Reported on 2025), Disp: 15 tablet, Rfl: 0    diclofenac (VOLTAREN) 50 MG EC tablet, Take 1 tablet by mouth 2 (Two) Times a Day As Needed (pain). (Patient not taking: Reported on 2025), Disp: 15 tablet, Rfl: 0    HYDROcodone-acetaminophen (NORCO) 5-325 MG per tablet, Take 1 tablet by mouth Every 6 (Six) Hours As Needed for Severe Pain . (Patient not taking: Reported on 2025), Disp: 10 tablet, Rfl: 0    methadone (DOLOPHINE) 10 MG/ML solution, Take 10.5 mL by mouth Daily., Disp: , Rfl:     ondansetron ODT (ZOFRAN-ODT) 4 MG disintegrating tablet, Place 1 tablet on the tongue Every 4 (Four) Hours As Needed for Nausea or Vomiting. (Patient not taking: Reported on 2025), Disp: 15 tablet, Rfl: 0    QUEtiapine fumarate ER (SEROquel XR) 50 MG tablet sustained-release 24 hour tablet, Take  by mouth Every Night. (Patient not taking: Reported on 2025), Disp: , Rfl:     Topiramate (TOPAMAX PO), Take  by mouth. (Patient not taking: Reported on 2025), Disp: , Rfl:     Past Medical History:   Diagnosis Date    Bipolar 1 disorder     Depression      Past Surgical History:   Procedure Laterality Date     SECTION      x 6    CHOLECYSTECTOMY      REPLACEMENT TOTAL HIP LATERAL POSITION       Social History     Socioeconomic History    Marital status:    Tobacco Use    Smoking status: Every Day     Current packs/day: 1.00     Types: Cigarettes     Passive exposure: Current   Vaping Use    Vaping status: Former   Substance and Sexual Activity    Alcohol use: Never    Drug use: Yes     Types: Marijuana     Comment: Pr states she smokes marijuana some over the past couple of days    Sexual  activity: Defer           Objective     Vitals:    04/23/25 1446   BP: 116/56   BP Location: Left arm   Patient Position: Sitting   Pulse: 76   Resp: 18  Comment: 100 02   Temp: (!) 101.1 °F (38.4 °C)   TempSrc: Temporal   PainSc: 8    PainLoc: Abdomen     There is no height or weight on file to calculate BMI.    The following data has been reviewed by LILY Leon on 04/23/2025       Culture results from last 30 days   Lab 04/17/25  1103 04/08/25  0004   WOUNDCX No growth at 3 days Rare growth Normal Skin Emi        No Images in the past 120 days found..     STEADI Fall Risk Assessment has not been completed.     Review of Systems     ROS:  Per HPI.     I have reviewed the HPI and ROS as documented by MA/RN. LILY Leon    Physical Exam     NAD  AAOx3, pleasant, cooperative    Physical Exam  Right breast: The right central breast line inferior to the nipple continues to demonstrate dehiscence. Wound base tissue is pink, moist, and healthy with small areas of crusting and yellow slough. No active drainage is observed. A small amount of crusting extends down the incision line. Wound dehiscence is noted along the inferior transverse breast incision. Healthy red, moist granular tissue is visible within the wound base. Wound base appears to have contracted some with the depth of the cavity. Suture material is visible within the margin into the base of the wound. Subcutaneous tissue visible. Wound with minor serosanguineous drainage.    Left breast: The left breast has an area of thick, tightly adhered crusting inferior to the nipple along the central aspect. The transverse incision along the inferior lower breast has a small area of open tissue with yellow slough and pink moist tissue to the wound base.     Abdomen: The transverse abdominal incision has an area of tightly adhered crusting along the left lateral most aspect. There is a larger dehiscence along the left lateral abdomen above the left  groin with healthy pink moist tissue with areas of yellow slough. Tissue is intermittently dry. No active drainage is observed. Along the central lower abdomen, there are 3 areas of dehiscence to the left of midline. There is a small cavity with visible yellow slough and pink moist tissue. Medial to that opening is an area of dried yellow crusting. The central most area has wound dehiscence with thick, dry, tightly adhered and dried slough with small areas of moisture and increased separation. The right abdomen has 2 areas of more significant dehiscence with increased depth and connected below the surface. Pink moist tissue and subcutaneous tissues are visible. The larger wound has significant tunneling both laterally and medially with a cavity extending deep into the abdomen. Cavity further extends into into the mons pubis. Serous drainage and serosanguineous drainage are noted today.     Umbilicus: Circumferential crusting, more along the right vs. Left. Pink tissue visible surrounding the margins.     See photos for details.    Right breast:            Abdomen:                  Left breast:        Result Review :  The following data was reviewed by: LILY Leon on 04/23/2025:    Prior wound care notes and images. Plastic Surgery notes.    Assessment and Plan   Diagnoses and all orders for this visit:    1. Failed flap (Primary)    2. Postoperative wound dehiscence, subsequent encounter  -     Wound Vac    3. Open wound of right female breast, subsequent encounter    4. Open wound of abdomen, subsequent encounter  -     Wound Vac    5. Open wound of left breast, subsequent encounter    6. S/P abdominoplasty    7. S/P breast augmentation    8. Nicotine dependence with current use    9. Class 3 severe obesity with body mass index (BMI) of 45.0 to 49.9 in adult, unspecified obesity type, unspecified whether serious comorbidity present      Assessment & Plan  1. Multiple wounds.  The patient presents with  multiple wounds again today. Some of the wound openings have gotten slightly larger, but some of the depth of the cavities, appears to have contracted. Silicone implant of the right breast has now been removed.There is no presentation or drainage today consistent with infection. Patient does have increased concerns of infection however.   The patient was informed about the potential benefits and costs associated with the wound VAC, including the risk of self-pay if insurance does not approve it. The wound VAC could expedite healing by reducing pressure and tension, promoting increased blood flow, and reducing swelling and inflammation. She is agreeable and would like to proceed with Wound Vac placement.  Today we will place a Wound Vac within the larger abdominal wounds to the right lower abdomen. Wound vac orders will be as written below. Dressing to be changed 3 x week. Additionally, pending tolerance with wound vac, will plan to incorporate NPWT to the right breast possibly next week.   The patient was advised to continue using normal saline dressings to the right inferior transverse breast wound, changing in 1 - 2 x day for approximately the next week. As previously instructed, gauze can be moistened with NS prior to removal to decrease trauma.   Recommending that dressing changes to the right central breast, left central and inferior breast as well as central and left abdomen be performed with each wound vac dressing change 3 x week, that consist of cleansing /irrigating with Hypercholrous acid, followed by application of Aquacel AG and a dry dressing or drape if along the abdominal wall.   The patient was also advised to wear a supportive sports bra for additional support and to use an abdominal binder for the abdomen for support. An Abdominal binder will be provided today.   The patient was informed that hyperbaric oxygen therapy could be considered if approved by insurance. We discussed the necessary  commitment and that she would require 40 treatments.     VAC Instructions:   Clean wound with hyperchlorous acid (Vashe) or NS .  Pat dry thoroughly.    Apply Skin-Prep to skin surrounding wound and then apply protective drape to surrounding periwound skin.  Apply white foam within the deep tunnels medially and laterally as well as to the depth within the abdominal cavity and into the mons pubis.  Fill the remaining wound cavity with black foam all the way to the base.  Do not allow foam to come in contact with any healthy skin.  Cover black foam with drape and cut small hole in center.  ApplyTRAC pad and tubing over hole and reinforce edges to ensure no leaks. OR, bridge with additional foam to get TRAC pad and tubing away from sensitive or weight bearing areas if necessary.  Attach to continuous suction at 125 mmHg.  Change 3 times per week.  Be sure the patient is not lying, standing, or sitting directly on the tubing.  Gauze or ABD pads may be used to further pad the tubing etc. to prevent pressure injury from the device.      2. Smoking cessation.   Patient is strongly encouraged to quit smoking to promote overall wound healing and to decrease risks for complications.     Follow-up in one week.         Patient was given instructions and counseling regarding their condition or for health maintenance advice, as well as the wound care plan and recommendations. They understand and agree with the plan.  They will follow back up here in the clinic but are instructed to contact us in the interim should they have any new, returning, or worsening symptoms or concerns. Please see specific information pulled into the AVS if appropriate.     Dragon Dictation utilized for chart completion.    I spent 54 minutes in both face-to-face and nonface-to-face time for patient care today including, but not limited to, review of old records, reviewing old images, history and physical exam, reviewing test results, counseling patient,  entering orders, coordinating care, and documenting.      Follow Up   No follow-ups on file.      LILY Leon    Patient or patient representative verbalized consent for the use of Ambient Listening during the visit with  LILY Leon for chart documentation. 4/23/2025  17:32 EDT

## 2025-04-23 NOTE — PROCEDURES
Procedure   Wound Vac    Performed by: Sharron Piedra APRN  Authorized by: Sharron Piedra APRN  Associated wounds:   Wound 04/15/25 1646 Right lower abdomen  Wound 04/15/25 1650 medial abdomen    Wound Vac:     Type of Foam:  White and Black    mmH    Frequency of change:  3x week    Patient tolerance:  Good

## 2025-04-24 PROBLEM — S21.002A OPEN WOUND OF LEFT BREAST: Status: ACTIVE | Noted: 2025-04-24

## 2025-04-24 PROBLEM — S31.109A OPEN WOUND OF ABDOMEN: Status: ACTIVE | Noted: 2025-04-24

## 2025-04-24 PROBLEM — S21.001A: Status: ACTIVE | Noted: 2025-04-24

## 2025-04-25 ENCOUNTER — CLINICAL SUPPORT (OUTPATIENT)
Dept: WOUND CARE | Facility: HOSPITAL | Age: 40
End: 2025-04-25
Payer: COMMERCIAL

## 2025-04-25 VITALS
TEMPERATURE: 98.5 F | SYSTOLIC BLOOD PRESSURE: 115 MMHG | DIASTOLIC BLOOD PRESSURE: 58 MMHG | HEART RATE: 76 BPM | RESPIRATION RATE: 18 BRPM

## 2025-04-25 DIAGNOSIS — T81.31XD POSTOPERATIVE WOUND DEHISCENCE, SUBSEQUENT ENCOUNTER: Primary | ICD-10-CM

## 2025-04-25 DIAGNOSIS — S31.109D OPEN WOUND OF ABDOMEN, SUBSEQUENT ENCOUNTER: ICD-10-CM

## 2025-04-25 PROCEDURE — 97605 NEG PRS WND THER DME<=50SQCM: CPT | Performed by: NURSE PRACTITIONER

## 2025-04-25 NOTE — PROCEDURES
Procedure   Wound Vac    Performed by: Roxi Rock, RN  Authorized by: Sharron Piedra APRN  Associated wounds:   Wound 04/15/25 1646 Right lower abdomen  Wound 04/15/25 1650 medial abdomen    Wound Vac:     Type of Foam:  Black and White    mmH    continuous      Frequency of change:  3 times weekly    Patient tolerance:  Fair

## 2025-04-25 NOTE — PROGRESS NOTES
Pt arrived to clinic with wound vac and silicone dressings in place, some packing came out of right posterior breast wound last night.  Silicone dressings remove, wounds cleansed with Vashe/normal saline, blotted dry and dressed per orders. Pt tolerated well.  Wound vac to lower right and medial abdomen removed using saline and lidocaine r/t pain during foam removal.  Pt stated lidocaine helped with the pain during the foam removal.  Wound cleansed with Vashe and blotted dry. 2 pieces of white foam cut into spiral used to lightly pack tunnels at 12, 3-5 and 9 o'clock, 1 piece of mepitel used in wound bed on anterior portion of wound bed, cavity filled with 3 pieces of black foam and covered with transparent drape, 2 pieces of black foam used for trac pad.  Wound vac set to 125 mmHg continuous suction.  No issues with vac alarms.  Pt tolerated fair.

## 2025-04-26 DIAGNOSIS — T81.31XD POSTOPERATIVE WOUND DEHISCENCE, SUBSEQUENT ENCOUNTER: ICD-10-CM

## 2025-04-26 DIAGNOSIS — R52 ACUTE PAIN: Primary | ICD-10-CM

## 2025-04-26 RX ORDER — KETOROLAC TROMETHAMINE 10 MG/1
TABLET, FILM COATED ORAL
Qty: 18 TABLET | Refills: 0 | Status: SHIPPED | OUTPATIENT
Start: 2025-04-26 | End: 2025-05-01

## 2025-04-28 ENCOUNTER — TELEPHONE (OUTPATIENT)
Dept: WOUND CARE | Facility: HOSPITAL | Age: 40
End: 2025-04-28
Payer: COMMERCIAL

## 2025-04-28 ENCOUNTER — CLINICAL SUPPORT (OUTPATIENT)
Dept: WOUND CARE | Facility: HOSPITAL | Age: 40
End: 2025-04-28
Payer: COMMERCIAL

## 2025-04-28 ENCOUNTER — PATIENT MESSAGE (OUTPATIENT)
Dept: WOUND CARE | Facility: HOSPITAL | Age: 40
End: 2025-04-28
Payer: COMMERCIAL

## 2025-04-28 VITALS
TEMPERATURE: 97.4 F | SYSTOLIC BLOOD PRESSURE: 126 MMHG | RESPIRATION RATE: 16 BRPM | HEART RATE: 84 BPM | DIASTOLIC BLOOD PRESSURE: 68 MMHG

## 2025-04-28 DIAGNOSIS — S31.109D OPEN WOUND OF ABDOMEN, SUBSEQUENT ENCOUNTER: Primary | ICD-10-CM

## 2025-04-28 DIAGNOSIS — S21.001D OPEN WOUND OF RIGHT FEMALE BREAST, SUBSEQUENT ENCOUNTER: ICD-10-CM

## 2025-04-28 DIAGNOSIS — E66.813 CLASS 3 SEVERE OBESITY WITH BODY MASS INDEX (BMI) OF 45.0 TO 49.9 IN ADULT, UNSPECIFIED OBESITY TYPE, UNSPECIFIED WHETHER SERIOUS COMORBIDITY PRESENT: ICD-10-CM

## 2025-04-28 DIAGNOSIS — E66.01 CLASS 3 SEVERE OBESITY WITH BODY MASS INDEX (BMI) OF 45.0 TO 49.9 IN ADULT, UNSPECIFIED OBESITY TYPE, UNSPECIFIED WHETHER SERIOUS COMORBIDITY PRESENT: ICD-10-CM

## 2025-04-28 DIAGNOSIS — T81.31XS POSTOPERATIVE WOUND DEHISCENCE, SEQUELA: ICD-10-CM

## 2025-04-28 DIAGNOSIS — Z98.82 S/P BREAST AUGMENTATION: ICD-10-CM

## 2025-04-28 DIAGNOSIS — F17.200 NICOTINE DEPENDENCE WITH CURRENT USE: ICD-10-CM

## 2025-04-28 DIAGNOSIS — T86.821 FAILED FLAP: ICD-10-CM

## 2025-04-28 DIAGNOSIS — S21.002D OPEN WOUND OF LEFT BREAST, SUBSEQUENT ENCOUNTER: ICD-10-CM

## 2025-04-28 DIAGNOSIS — Z98.890 S/P ABDOMINOPLASTY: ICD-10-CM

## 2025-04-28 PROCEDURE — 87147 CULTURE TYPE IMMUNOLOGIC: CPT | Performed by: NURSE PRACTITIONER

## 2025-04-28 PROCEDURE — 97605 NEG PRS WND THER DME<=50SQCM: CPT | Performed by: NURSE PRACTITIONER

## 2025-04-28 PROCEDURE — 87186 SC STD MICRODIL/AGAR DIL: CPT | Performed by: NURSE PRACTITIONER

## 2025-04-28 PROCEDURE — 99417 PROLNG OP E/M EACH 15 MIN: CPT | Performed by: NURSE PRACTITIONER

## 2025-04-28 PROCEDURE — 87205 SMEAR GRAM STAIN: CPT | Performed by: NURSE PRACTITIONER

## 2025-04-28 PROCEDURE — 87070 CULTURE OTHR SPECIMN AEROBIC: CPT | Performed by: NURSE PRACTITIONER

## 2025-04-28 PROCEDURE — 87077 CULTURE AEROBIC IDENTIFY: CPT | Performed by: NURSE PRACTITIONER

## 2025-04-28 PROCEDURE — 99215 OFFICE O/P EST HI 40 MIN: CPT | Performed by: NURSE PRACTITIONER

## 2025-04-28 NOTE — TELEPHONE ENCOUNTER
"Patient is having issues with transportation to get to her ONS appointments.  She has sent several MyChart messages to describe the drainage and wound vac issues. Patient had suggested going to [facility] to have wound vac changed.  Writer called [facility] and spoke with Livier.  Livier had stated they can do wound vac dressing changes there, but not assume care as there is no provider in the clinic.  She also stated the patient would have to have a PCP that would write the order for the wound vac.  They would need a prior authorization before accepting the patient.    Writer spoke with LILY Leon extensively regarding patient's care and what the best option of care would be.  Sharron had stated the patient should come to our office so she can assess the drainage and wounds in case they need further medical attention.    Writer called patient to discuss options and recommendations.  Patient stated \"I can't make it there today, but I could make it tomorrow\".  Writer educated patient as the wound vac dressing has been in place for 72 hours and could cause infection, more pain, and further damage to the wound.  Patient stated \"I was afraid you were going to say that.  I just don't know if I can make it, but I guess I'll try\".  Writer explained to patient the hours the clinic was open and asked for a phone call if she wasn't able to make it.  Writer also explained to patient that [facility] messages don't always get to our inbox in a timely manner and it would be best to keep calling. She stated she understood.     Will continue with plan of care.   "

## 2025-04-28 NOTE — PROCEDURES
Procedure   Wound Vac    Performed by: Ramya Soliz, RN  Authorized by: Sharron Piedra APRN  Associated wounds:   Wound 04/15/25 1646 Right lower abdomen  Wound 04/15/25 1650 medial abdomen    Wound Vac:     Type of Foam:  Black and White    mmH    continuous      Frequency of change:  3 times weekly    Patient tolerance:  Good

## 2025-04-29 ENCOUNTER — TELEPHONE (OUTPATIENT)
Dept: WOUND CARE | Facility: HOSPITAL | Age: 40
End: 2025-04-29
Payer: COMMERCIAL

## 2025-04-29 NOTE — PROGRESS NOTES
Chief Complaint  Wound Check (Pt seen today for wound check. Pt noticed a foul smell coming from her breast and abd. Pt stated she had a temp of 101.4 last night but has not had one today. )    Subjective      History of Present Illness    Ana Weathers  is a 40 y.o. female who presents today for a follow-up of multiple surgical wounds with dehiscence.  She is reporting increased discomfort, questionable drainage, concerns with NP WT therapy, concerns with possible bleeding.    Patient is status post abdominoplasty, breast augmentation and BBL performed in [place] on 03/21/2025.    Patient was seen and evaluated by plastic surgery on 4/17/2025.  During that visit, the implant to the right breast was removed and multiple surface level debridements along with suture removals were performed.     last Wednesday, NPWT therapy to the abdominal wound was initiated and thus far patient has overall tolerated well.  She is voicing some increased concerns about drainage due to the color noted within the canister as well as the frequent canister changes that are necessary.  She is changing her canister approximately once per day.  Drainage is noted to now be more of a tan/serosanguineous color.  She was also complaining of increased pain to her abdomen unrelieved with conventional measures and without medications.  She has been taking Tylenol and ibuprofen with no relief.  A prescription for Toradol was sent to the pharmacy for her over the weekend however she has not yet started taking the medication as she was concerned about possible bleeding.      She stated that she had noticed increased bloody drainage from her right breast wound and was concerned about possible bleeding.    Additionally she has been concerned about possible infection.  She has had a low-grade temp intermittently since last week however reports that she has had significant congestion and that both of her smaller children have been ill at home as well.   She does not believe overall that she is sick but does believe that it is related to the congestion she experiencing.    She verbalized to the staff additionally that her menstrual cycle has been delayed and she has been concerned about possible pregnancy however she has taken multiple pregnancy tests with negative results.    Her wound care regimen has overall consisted of NP WT therapy due to the abdominal cavity and the larger wounds on the right side of the abdomen 3 times per week by outpatient nursing services here at the wound clinic.  She has been receiving silver impregnated Hydrofiber and a dry dressing to the wounds on her left breast as well as the right central inferior breast and medial in the left abdominal wounds.  These are being changed with each wound VAC dressing change.  The right inferior transverse breast incision should be receiving daily to twice daily normal saline dressing changes that is secured with a dry dressing.    Allergies:  Codeine      Current Outpatient Medications:     buPROPion SR (WELLBUTRIN SR) 150 MG 12 hr tablet, Take  by mouth 2 (Two) Times a Day. (Patient not taking: Reported on 4/17/2025), Disp: , Rfl:     cyclobenzaprine (FLEXERIL) 10 MG tablet, Take 1 tablet by mouth 3 (Three) Times a Day As Needed for Muscle Spasms. (Patient not taking: Reported on 4/17/2025), Disp: 15 tablet, Rfl: 0    diclofenac (VOLTAREN) 50 MG EC tablet, Take 1 tablet by mouth 2 (Two) Times a Day As Needed (pain). (Patient not taking: Reported on 4/17/2025), Disp: 15 tablet, Rfl: 0    HYDROcodone-acetaminophen (NORCO) 5-325 MG per tablet, Take 1 tablet by mouth Every 6 (Six) Hours As Needed for Severe Pain . (Patient not taking: Reported on 4/17/2025), Disp: 10 tablet, Rfl: 0    ketorolac (TORADOL) 10 MG tablet, Take 1 tablet by mouth 2 (Two) Times a Day As Needed for Moderate Pain for 1 day, THEN 1 tablet Every 6 (Six) Hours As Needed for up to 4 days., Disp: 18 tablet, Rfl: 0    methadone  (DOLOPHINE) 10 MG/ML solution, Take 10.5 mL by mouth Daily., Disp: , Rfl:     ondansetron ODT (ZOFRAN-ODT) 4 MG disintegrating tablet, Place 1 tablet on the tongue Every 4 (Four) Hours As Needed for Nausea or Vomiting. (Patient not taking: Reported on 2025), Disp: 15 tablet, Rfl: 0    QUEtiapine fumarate ER (SEROquel XR) 50 MG tablet sustained-release 24 hour tablet, Take  by mouth Every Night. (Patient not taking: Reported on 2025), Disp: , Rfl:     Topiramate (TOPAMAX PO), Take  by mouth. (Patient not taking: Reported on 2025), Disp: , Rfl:     Past Medical History:   Diagnosis Date    Bipolar 1 disorder     Cigarette smoker motivated to quit     Depression      Past Surgical History:   Procedure Laterality Date     SECTION      x 6    CHOLECYSTECTOMY      REPLACEMENT TOTAL HIP LATERAL POSITION       Social History     Socioeconomic History    Marital status:    Tobacco Use    Smoking status: Every Day     Current packs/day: 1.00     Types: Cigarettes     Passive exposure: Current   Vaping Use    Vaping status: Former   Substance and Sexual Activity    Alcohol use: Never    Drug use: Yes     Types: Marijuana     Comment: Pr states she smokes marijuana some over the past couple of days    Sexual activity: Defer         Objective     Vitals:    25 1754   BP: 126/68   BP Location: Right arm   Patient Position: Sitting   Cuff Size: Adult   Pulse: 84   Resp: 16  Comment: 98% RA   Temp: 97.4 °F (36.3 °C)   TempSrc: Temporal   PainSc: 5    PainLoc: Abdomen     There is no height or weight on file to calculate BMI.    The following data has been reviewed by LILY Leon on 2025       Culture results from last 30 days   Lab 25  1752 25  1103 25  0004   WOUNDCX Light growth (2+) Streptococcus agalactiae (Group B)* No growth at 3 days Rare growth Normal Skin Emi        No Images in the past 120 days found..     STEADI Fall Risk Assessment has not been  completed.     Review of Systems     ROS:  Per HPI.     I have reviewed the HPI and ROS as documented by MA/RN. LILY Leon    Physical Exam     NAD  AAOx3, pleasant, cooperative    Right breast: Right central breast incision just inferior to the nipple line has pink moist tissue within the base.  There is increased epithelialization along the wound margins.  No active drainage and no signs of infection.  The right transverse inferior breast incision overall has a healthy appearance.  Wound base has healthy red granular tissue along with visibility of subcutaneous and adipose tissue.  There is no odor coming from the wound.  No purulence.  No active signs of infection.    Left breast: Left central breast incision with small area of dehiscence with pink moist tissue and increased epithelialization along the wound margins.  No active drainage no signs of infections  Left transverse breast incision with small area of dehiscence with pink moist tissue and thinning yellow slough within the wound base.  No active drainage.  No signs of infection.    Abdomen: Left lateral abdomen with 2 areas of wound dehiscence 1 smaller area with dried yellow slough and dried pink tissue and no active drainage.  The larger left lateral wound has thinning yellow slough within the wound base as well as healthy red moist tissue.  Underlying sutures visible.  No active drainage and no signs of infection periwound with minor redness.  Central abdominal wounds with continued presentation of yellow slough that is thinning and allowing for visibility of underlying subcutaneous and adipose tissues.  Underlying sutures visible.  Increased depth due to thinning of the slough. No active drainage from these areas.  Minor periwound redness.  No active signs of infection.  Right lateral abdominal wounds with dehiscence and full visibility of an abdominal cavity.  Healthy red granular tissue noted within the immediate wound margins.   Subcutaneous and adipose tissue visible within the wound cavity.  Continued tunneling at 9:00 and below the surface at 3:00.  Cavity tracks deep into the abdomen below tissue layer as well as into the mons pubis.  Minor periwound redness to the abdominal wounds.  No odor from the wound base.  Serosanguineous drainage noted to the swab and gauze with cleansing.  Tan and serosanguineous drainage noted combined within the wound VAC canister.  Wound culture obtained from the abdominal wound today.    See photos for details.    Right breast:          Left breast:          Abdomen:                 Result Review :  The following data was reviewed by: LILY Leon on 04/28/2025:    Prior wound care notes and images.    Assessment and Plan   Diagnoses and all orders for this visit:    1. Open wound of abdomen, subsequent encounter (Primary)  -     Wound Culture - Wound, Abdominal Wall; Future  -     Wound Culture - Wound, Abdominal Wall  -     Wound Vac    2. Postoperative wound dehiscence, sequela  -     Wound Vac    3. Open wound of right female breast, subsequent encounter    4. Open wound of left breast, subsequent encounter    5. Failed flap    6. S/P abdominoplasty  -     Wound Culture - Wound, Abdominal Wall; Future  -     Wound Culture - Wound, Abdominal Wall    7. S/P breast augmentation    8. Nicotine dependence with current use    9. Class 3 severe obesity with body mass index (BMI) of 45.0 to 49.9 in adult, unspecified obesity type, unspecified whether serious comorbidity present      Patient presented today with multiple concerns that she has been experiencing over the weekend.    Wounds to bilateral breast overall appear healthy and are demonstrating continued improvement and progression towards healing.  There is no odor and no active drainage from these areas that is concerning for infection today.  Will recommend to continue application of silver impregnated Hydrofiber, Aquacel Ag, to the right  central breast as well as the left breast wounds followed by a silicone border dressing.  These dressings can be changed with each wound VAC dressing change.  The transverse right breast incision should continue to have normal saline dressing changes 1-2 times per day to assist with continued healing and cleansing.  Will consider including the right transverse breast incision with negative pressure wound therapy at her appointment on Wednesday.  Patient is encouraged to continue wearing compression garments to assist with overall wound healing and to prevent further dehiscence.  She does state that she is wearing a compression garment however did not apply it today.    The abdominal wounds overall remained stable.  There is some presentation of wound healing as evidenced by red granular tissue noted within the larger abdominal wounds along the right side.  Also the larger left lateral wound is demonstrating increased presence of healthy red moist tissue with thinning slough.  There is increased depth noted along the central wounds however the previously noted slough is thinning and removing allowing for visibility of subcutaneous and adipose tissues.  Wounds overall visually appear larger however this is due to the continued process of healing with resolution of nonviable tissue.  Due to concerns with the color of drainage within the abdominal cavity, a wound culture was obtained today.  Treatment with antibiotics will be contingent upon the wound culture results.  Patient does have a supply of Bactrim at home of which she is already taken 1 dose due to concerns for infection.  She is concerned about progression of infection into her hip.  Will continue to monitor her drainage and also await the outcomes of the wound culture results.  Due to increased depth of the central wounds upon presentation today, these will be included in her NPWT treatments that are occurring 3 times a week per outpatient wound clinic  services.  We will continue applying silver impregnated Hydrofiber to the left lateral abdominal wounds.  This can be changed 3 times per week with her wound VAC dressing change.    Patient has verbalized concerns about being able to make it to her appointments due to time as well as distance from her home.  We did discuss transitioning her wound care to [facility] however she would still need to continue to follow-up with a provider here at the clinic for wound management.  Patient verbalizes today that she would prefer to just continue to come to this wound clinic for continued wound care and management.      Patient was additionally informed that no active bleeding was observed today and that she may take the prescribed Toradol as needed to assist with pain management.    Patient did verbalize that the wound VAC had on occasion paused therapy and then required resumption to regain seal and resume treatment.  Explained to the patient that she should continue to monitor and if this continues to happen that a new wound VAC can be requested for replacement.    Follow-up in 2 weeks.    Patient was given instructions and counseling regarding their condition or for health maintenance advice, as well as the wound care plan and recommendations. They understand and agree with the plan.  They will follow back up here in the clinic but are instructed to contact us in the interim should they have any new, returning, or worsening symptoms or concerns. Please see specific information pulled into the AVS if appropriate.     Dragon Dictation utilized for chart completion.    I spent 65 minutes in both face-to-face and nonface-to-face time for patient care today including, but not limited to, review of old records, reviewing old images, history and physical exam, reviewing test results, counseling patient, entering orders, coordinating care, and documenting.      Follow Up   Return in about 2 weeks (around 5/12/2025)  for Wound Check.      LILY Leon

## 2025-04-29 NOTE — TELEPHONE ENCOUNTER
Received returned call from patient. We discussed alarms / leaking she is experiencing. She states that when she has her knees pulled up into her abdomen, seal is obtained, but otherwise she gets a leak alarm. Encouraged patient to move her hand along the drape and apply slightly pressure to see if she can identify the area where leaking is occurring. Once the area is identified, she should apply additional strips of drape, following the 1,2,Blue application to obtain a good seal without having to keep her knees pulled up.   Further discussed that fluctuations in pressure can be normal when vac is attempting to achieve ideal pressure, however leak alarms should not be present.   She stated she will attempt to find the leak and address, if not, she will attempt to maintain her position, compressing the abdomen to allow for a good seal.   No further questions at this time.   Sharron Piedra, APRN

## 2025-04-29 NOTE — PROGRESS NOTES
Wound Location: Right Upper Breast    Wound Exudate: Moderate  Wound Debridement: Mechanically debrided with normal saline & gauze  Wound Thickness:Full    Cleanse with:  NS or antibacterial soap and water    Primary: Aquacel Ag  Secondary:  Secured with:Silicone Bandage  María-wound: Intact    Instructions: Cleanse wound with ns or antibacterial soap and water, pat dry with gauze, apply Aquacel Ag and secure with silicone border.    Wound Location: Right Lower Breast    Wound Exudate: Moderate  Wound Debridement: Mechanically debrided with normal saline & gauze  Wound Thickness:Full    Cleanse with:  NS or antibacterial soap and water    Primary: NS gauze roll  Secondary:  Secured with:Silicone Bandage  María-wound: Intact    Instructions: Cleanse wound with ns or antibacterial soap and water, pat dry with gauze, pack wound to wound bed with NS moistened gauze roll, ensuring the entire wound is filled and secure with silicone border.    Wound Location: Left Lower Abdomen    Wound Exudate: Moderate  Wound Debridement: Mechanically debrided with normal saline & gauze  Wound Thickness:Full    Cleanse with:  NS or antibacterial soap and water    Primary: Aquacel Ag  Secondary:  Secured with:Silicone Bandage  María-wound: Intact    Instructions: Cleanse wound with ns or antibacterial soap and water, pat dry with gauze, apply Aquacel Ag and secure with silicone border.    Education: Educated patient/family member/CG on how to apply new dressings, verbal understanding provided. Patient/family member/CG instructed to call with any questions or concerns

## 2025-04-30 ENCOUNTER — RESULTS FOLLOW-UP (OUTPATIENT)
Dept: WOUND CARE | Facility: HOSPITAL | Age: 40
End: 2025-04-30
Payer: COMMERCIAL

## 2025-04-30 ENCOUNTER — CLINICAL SUPPORT (OUTPATIENT)
Dept: WOUND CARE | Facility: HOSPITAL | Age: 40
End: 2025-04-30
Payer: COMMERCIAL

## 2025-04-30 VITALS — HEART RATE: 75 BPM | SYSTOLIC BLOOD PRESSURE: 114 MMHG | RESPIRATION RATE: 16 BRPM | DIASTOLIC BLOOD PRESSURE: 46 MMHG

## 2025-04-30 DIAGNOSIS — S31.109D OPEN WOUND OF ABDOMEN, SUBSEQUENT ENCOUNTER: Primary | ICD-10-CM

## 2025-04-30 DIAGNOSIS — S21.001D OPEN WOUND OF RIGHT FEMALE BREAST, SUBSEQUENT ENCOUNTER: ICD-10-CM

## 2025-04-30 DIAGNOSIS — S21.002D OPEN WOUND OF LEFT BREAST, SUBSEQUENT ENCOUNTER: ICD-10-CM

## 2025-04-30 PROCEDURE — 97605 NEG PRS WND THER DME<=50SQCM: CPT | Performed by: NURSE PRACTITIONER

## 2025-04-30 NOTE — PROCEDURES
Procedure   Wound Vac    Performed by: Ramya Soliz, RN  Authorized by: Sharron Piedra APRN  Associated wounds:   Wound 04/15/25 1637 Right lower breast  Wound 04/15/25 1650 medial abdomen  Wound 04/15/25 1646 Right lower abdomen    Wound Vac:     Type of Foam:  Black and White    mmH    continuous      Frequency of change:  3 times weekly    Patient tolerance:  Good

## 2025-04-30 NOTE — PROGRESS NOTES
Pt arrived to clinic with wound vac and silicone dressings in place, some packing came out of right posterior breast wound last night.  Silicone dressings and wound vac removed, wounds cleansed with Vashe/normal saline, blotted dry and dressed per orders.  Right upper breast, Left lower ABD and Left breast were dressed with Aqaucel Ag and Silicone border. Right lower and medial ABD were skin prepped and draped, medial abd wound covered with 1 piece of Mepetil due to sutures being exposed. 1 piece of white foam filled to right lower abd tunnel at 3 and 9 o'clock with the tail visible and 2 piece of black foam to fill remainder of wound. 2 pieces of black foam to fill medial wound beds and cover mepetil. 1 piece of black foam to Right lower breast. Wound vac sealed to 125mmHg with no leaks. Pt tolerated well.

## 2025-05-01 LAB
BACTERIA SPEC AEROBE CULT: ABNORMAL
GRAM STN SPEC: ABNORMAL

## 2025-05-02 ENCOUNTER — CLINICAL SUPPORT (OUTPATIENT)
Dept: WOUND CARE | Facility: HOSPITAL | Age: 40
End: 2025-05-02
Payer: COMMERCIAL

## 2025-05-02 VITALS — DIASTOLIC BLOOD PRESSURE: 49 MMHG | TEMPERATURE: 98 F | SYSTOLIC BLOOD PRESSURE: 101 MMHG | HEART RATE: 70 BPM

## 2025-05-02 DIAGNOSIS — S21.001D OPEN WOUND OF RIGHT FEMALE BREAST, SUBSEQUENT ENCOUNTER: ICD-10-CM

## 2025-05-02 DIAGNOSIS — S21.002D OPEN WOUND OF LEFT BREAST, SUBSEQUENT ENCOUNTER: Primary | ICD-10-CM

## 2025-05-02 DIAGNOSIS — S31.109D OPEN WOUND OF ABDOMEN, SUBSEQUENT ENCOUNTER: ICD-10-CM

## 2025-05-02 PROCEDURE — 97606 NEG PRS WND THER DME>50 SQCM: CPT | Performed by: NURSE PRACTITIONER

## 2025-05-02 NOTE — PROGRESS NOTES
Patient arrived today with intact dressings to bilateral breast and left abdomen. NPWT therapy to the right inferior breast and right / medial abdomen intact with no signs of leaking, lifting or alarms. Small amount of Lidocaine applied within the abdominal wounds on the right side after removal of dressings.   All dressings removed and wounds cleansed and irrigated with NS and wound cleanser spray. Right medial breast wound, left breast wounds and left abdominal wounds all with silver impregnated hydrofiber and silicone border dressings per Therapy plan.   Right inferior transverse breast incision, right abdomen and medial abdomen all with NPWT being applied.   Abdominal wounds along the medial aspect are opening more. One suture loose within the wound base medial and then one suture along the right abdomen within the wound base only loosely adhered to tissue. This suture was removed today to prevent it from falling loose within the wound base.   White foam was placed into the tunnel at 9 o'clock within the abdominal wound as well as deep within the wound cavity. White foam was then tracked under the surface along the incision line. Black foam was utilized to fill the remainder of the wound cavity. Medial abdominal wounds were bridged with black foam.   Right breast wound filled with black foam only.   Once drape was applied, trac pads applied and tubing connected to y site connector. Vac engaged and all foam compressed. No leaks upon completion.   Tubing of the abdominal wound was bolstered with silicone border dressing.   Cannister was also changed today.   Patient tolerated well.

## 2025-05-02 NOTE — PROCEDURES
Procedure   Wound Vac    Performed by: Sharron Piedra APRN  Authorized by: Sharron Piedra APRN  Associated wounds:   Wound 04/15/25 1637 Right lower breast  Wound 04/15/25 1654 Left lower abdomen  Wound 04/15/25 1650 medial abdomen  Wound 04/15/25 1646 Right lower abdomen    Wound Vac:     Type of Foam:  Black and White    mmH    continuous      Frequency of change:  3x weekly    Patient tolerance:  Fair        VIRTUAL NURSE 2 HOUR ROUNDS:  Cued into patient's room.  Patient sitting up in bed watching TV.  No complaints.  No distress noted.  Will cont to monitor.

## 2025-05-05 ENCOUNTER — OFFICE VISIT (OUTPATIENT)
Dept: WOUND CARE | Facility: HOSPITAL | Age: 40
End: 2025-05-05
Payer: COMMERCIAL

## 2025-05-05 VITALS
TEMPERATURE: 98.7 F | SYSTOLIC BLOOD PRESSURE: 110 MMHG | RESPIRATION RATE: 18 BRPM | DIASTOLIC BLOOD PRESSURE: 53 MMHG | HEART RATE: 78 BPM

## 2025-05-05 DIAGNOSIS — T36.95XA: ICD-10-CM

## 2025-05-05 DIAGNOSIS — S21.002D OPEN WOUND OF LEFT BREAST, SUBSEQUENT ENCOUNTER: ICD-10-CM

## 2025-05-05 DIAGNOSIS — S31.109D OPEN WOUND OF ABDOMEN, SUBSEQUENT ENCOUNTER: Primary | ICD-10-CM

## 2025-05-05 DIAGNOSIS — T81.31XS POSTOPERATIVE WOUND DEHISCENCE, SEQUELA: ICD-10-CM

## 2025-05-05 DIAGNOSIS — Z98.890 S/P ABDOMINOPLASTY: ICD-10-CM

## 2025-05-05 DIAGNOSIS — T36.95XA ANTIBIOTICS CAUSING ADVERSE EFFECT IN THERAPEUTIC USE, INITIAL ENCOUNTER: ICD-10-CM

## 2025-05-05 DIAGNOSIS — R11.0 NAUSEA: ICD-10-CM

## 2025-05-05 DIAGNOSIS — S21.001D OPEN WOUND OF RIGHT FEMALE BREAST, SUBSEQUENT ENCOUNTER: ICD-10-CM

## 2025-05-05 DIAGNOSIS — F17.200 NICOTINE DEPENDENCE WITH CURRENT USE: ICD-10-CM

## 2025-05-05 DIAGNOSIS — Z98.82 S/P BREAST AUGMENTATION: ICD-10-CM

## 2025-05-05 DIAGNOSIS — T86.821 FAILED FLAP: ICD-10-CM

## 2025-05-05 DIAGNOSIS — R52 ACUTE PAIN: ICD-10-CM

## 2025-05-05 PROCEDURE — 1160F RVW MEDS BY RX/DR IN RCRD: CPT | Performed by: NURSE PRACTITIONER

## 2025-05-05 PROCEDURE — 99215 OFFICE O/P EST HI 40 MIN: CPT | Performed by: NURSE PRACTITIONER

## 2025-05-05 PROCEDURE — 1159F MED LIST DOCD IN RCRD: CPT | Performed by: NURSE PRACTITIONER

## 2025-05-05 PROCEDURE — 97606 NEG PRS WND THER DME>50 SQCM: CPT | Performed by: NURSE PRACTITIONER

## 2025-05-05 RX ORDER — SACCHAROMYCES BOULARDII 250 MG
250 CAPSULE ORAL 2 TIMES DAILY
Qty: 60 CAPSULE | Refills: 0 | Status: SHIPPED | OUTPATIENT
Start: 2025-05-05 | End: 2025-06-04

## 2025-05-05 NOTE — PROGRESS NOTES
Chief Complaint  Wound Check (Follow-up visit for multiple wounds to abdomen and bilateral breasts. )    Subjective      History of Present Illness    Ana Weathers  is a 40 y.o. female   History of Present Illness  The patient presents today for a follow-up wound check of multiple wounds status post plastic surgery performed in March.She is s/p Abdominoplasty, breast augmentation and BBL with lipo suction performed in [place] on 03/21/2025     She reports experiencing abdominal discomfort, which she attributes to her antibiotic regimen. She has been compliant with her compression therapy but notes that it exacerbates her stomach pain, necessitating its removal at times.     Wound culture of the abdomen obtained on 4/28/2025 revealed light growth of Streptococcus agalactiae, light growth of the Citrobacter braakii, and light growth of Enterobacter cloacae she is on an antibiotic regimen of Augmentin and Bactrim.    Her current wound care regimen consists of silver impregnated Hydrofiber, Aquacel Ag to the left breast wounds, the right central breast wound, and the left lateral abdominal wounds.  She is currently receiving NP WT therapy treatment to the right transverse inferior breast wound as well as the right and central abdominal wounds.  She is tolerating her treatments well at this point.  Dressing changes are being performed by outpatient wound care services on Monday, Wednesday and Friday.    She regrets not having ceased smoking prior to her surgery and continues to smoke, although at a reduced rate of half a pack per day. She is currently on Chantix as part of her smoking cessation efforts. She also reports fatigue and has been attempting to maximize her sleep duration.    She does continue to experience pain from her wounds and has been attempting to treat with Toradol previously prescribed as well as through her current methadone treatments.    SOCIAL HISTORY  The patient is still smoking, but has cut  down to about a half a pack. The patient is taking Chantix to help quit smoking.       Allergies:  Codeine      Current Outpatient Medications:     buPROPion SR (WELLBUTRIN SR) 150 MG 12 hr tablet, Take  by mouth 2 (Two) Times a Day. (Patient not taking: Reported on 2025), Disp: , Rfl:     cyclobenzaprine (FLEXERIL) 10 MG tablet, Take 1 tablet by mouth 3 (Three) Times a Day As Needed for Muscle Spasms. (Patient not taking: Reported on 2025), Disp: 15 tablet, Rfl: 0    diclofenac (VOLTAREN) 50 MG EC tablet, Take 1 tablet by mouth 2 (Two) Times a Day As Needed (pain). (Patient not taking: Reported on 2025), Disp: 15 tablet, Rfl: 0    HYDROcodone-acetaminophen (NORCO) 5-325 MG per tablet, Take 1 tablet by mouth Every 6 (Six) Hours As Needed for Severe Pain . (Patient not taking: Reported on 2025), Disp: 10 tablet, Rfl: 0    methadone (DOLOPHINE) 10 MG/ML solution, Take 10.5 mL by mouth Daily., Disp: , Rfl:     ondansetron ODT (ZOFRAN-ODT) 4 MG disintegrating tablet, Place 1 tablet on the tongue Every 4 (Four) Hours As Needed for Nausea or Vomiting. (Patient not taking: Reported on 2025), Disp: 15 tablet, Rfl: 0    QUEtiapine fumarate ER (SEROquel XR) 50 MG tablet sustained-release 24 hour tablet, Take  by mouth Every Night. (Patient not taking: Reported on 2025), Disp: , Rfl:     saccharomyces boulardii (Florastor) 250 MG capsule, Take 1 capsule by mouth 2 (Two) Times a Day for 30 days., Disp: 60 capsule, Rfl: 0    Topiramate (TOPAMAX PO), Take  by mouth. (Patient not taking: Reported on 2025), Disp: , Rfl:     Past Medical History:   Diagnosis Date    Bipolar 1 disorder     Cigarette smoker motivated to quit     Depression      Past Surgical History:   Procedure Laterality Date     SECTION      x 6    CHOLECYSTECTOMY      REPLACEMENT TOTAL HIP LATERAL POSITION       Social History     Socioeconomic History    Marital status:    Tobacco Use    Smoking status: Every Day      Current packs/day: 1.00     Types: Cigarettes     Passive exposure: Current   Vaping Use    Vaping status: Former   Substance and Sexual Activity    Alcohol use: Never    Drug use: Yes     Types: Marijuana     Comment: Pr states she smokes marijuana some over the past couple of days    Sexual activity: Defer           Objective     Vitals:    05/05/25 1036   BP: 110/53   BP Location: Left arm   Patient Position: Sitting   Pulse: 78   Resp: 18  Comment: 97 02   Temp: 98.7 °F (37.1 °C)   TempSrc: Temporal   PainSc: 6    PainLoc: Abdomen     There is no height or weight on file to calculate BMI.    The following data has been reviewed by LILY Leon on 05/05/2025       Culture results from last 30 days   Lab 04/28/25  1752 04/17/25  1103 04/08/25  0004   WOUNDCX Light growth (2+) Streptococcus agalactiae (Group B)*  Light growth (2+) Citrobacter braakii*  Light growth (2+) Enterobacter cloacae complex*  Light growth (2+) Normal Skin Emi No growth at 3 days Rare growth Normal Skin Emi        No Images in the past 120 days found..     STEADI Fall Risk Assessment has not been completed.     Review of Systems     ROS:  Per HPI.     I have reviewed the HPI and ROS as documented by MA/RN. LILY Leon    Physical Exam     NAD  AAOx3, pleasant, cooperative    Physical Exam  Left breast: The left central breast wound and the left inferior breast wound are crusted and dried. There is no open tissue visible. No active drainage. No TTP.     Right breast: The right central breast just distal to the nipple is demonstrating healthy pink moist tissue with periwound dryness. The inferior transverse breast incision has healthy red granulation tissue noted within the wound base. Wound margins do appear to be alba. Small amount of serosanguineous drainage noted. Moderate TTP to this area.   Small superficial visible suture material just proximal to the transverse incision line. This was cleansed and  removed with gauze and normal saline and pulled away with tweezers.     Abdomen: Left lateral most abdominal wound with shallow depth below surface. Yellow and pink wound base with Subcutaneous tissue visible. No active drainage.   Larger left lateral abdominal wound with healthy pink moist tissue and decreasing amounts of yellow slough. Underlying sutures within the tissue but not protruding above surface. No significant depth. No active drainage. No signs of infection.  Right lateral abdominal wounds as well as medial abdominal wounds all connecting below the skin surface with full dehiscence. Right abdominal wound the largest, revealing deeper abdominal cavity that extends into the abdomen as well as into the mons pubis. Significant tunneling below the skin surface from the right lateral abdomen towards the left side of the abdomen. Tunnel at 9:00 at the right lateral  wound is decreasing in size. Healthy red moist tissue is visible within the base subcutaneous tissue is becoming minimal. No purulence identified. No active signs of infection.  1 suture removed from within the wound base of the right lateral abdominal wound.  2 prolonged extending suture ends removed from the base of the abdominal wound.  Minor periwound redness noted to the areas of dehiscence along the transverse abdominal incision.    See photos for details.  Right breast:        Left breast:        Abdomen:                Result Review :  The following data was reviewed by: LILY Leon on 05/05/2025:    Prior wound care notes and images.    Assessment and Plan   Diagnoses and all orders for this visit:    1. Open wound of abdomen, subsequent encounter (Primary)    2. Open wound of left breast, subsequent encounter    3. Open wound of right female breast, subsequent encounter    4. Postoperative wound dehiscence, sequela    5. Failed flap    6. Systemic antibiotic causing adverse effect in therapeutic use, initial encounter  -      saccharomyces boulardii (Florastor) 250 MG capsule; Take 1 capsule by mouth 2 (Two) Times a Day for 30 days.  Dispense: 60 capsule; Refill: 0    7. S/P abdominoplasty    8. Nausea  -     saccharomyces boulardii (Florastor) 250 MG capsule; Take 1 capsule by mouth 2 (Two) Times a Day for 30 days.  Dispense: 60 capsule; Refill: 0    9. S/P breast augmentation    10. Nicotine dependence with current use    11. Acute pain    12. Antibiotics causing adverse effect in therapeutic use, initial encounter    Other orders  -     Wound Vac      Assessment & Plan  1. Postoperative wound care.  The patient presents today for a follow-up wound check of multiple wounds status post plastic surgery performed in March.  Patient's wounds overall are demonstrating improvement as well as response to current treatment regimen.  The left breast wounds are crusted and dried at this time.  The right central breast wound is demonstrating healthy moist tissue.  The transverse right breast wound is alba as well as demonstrating healthy red granular tissue and response to NPWT therapy.  The right lateral and central abdominal wounds remain with significant dehiscence however the underlying cavity is reducing in size as well as the tunnels.  There does remain a significantly large underlying cavity below the surface that requires ongoing NP WT therapy.  Recommending that the patient continue with this at this time.  The left lateral abdominal wound is demonstrating increased healthy pink moist tissue and reduction and yellow slough.  The left lateralmost abdominal wound is shallow with subcutaneous tissue and small amount of pink moist tissue visible within the wound base.  Recommending that the patient continue with NP WT therapy treatments to the right transverse breast incision as well as the right lateral and central abdominal wounds 3 times per week.  Orders as previously written.  Patient should additionally continue with silver  impregnated Hydrofiber and a silicone border dressing to the right central breast wound as well as the left lateral abdominal wounds.  Recommending that the patient receive a topical treatment with nonadherent petroleum-based gauze or oil emulsion gauze to the left breast that is secured with a dry dressing.  The patient is advised to continue using Aquaphor on the belly button area. She may begin using the smallest size shaper to the umbilicus to avoid damage to the skin.   Dressing changes will continue to be performed by outpatient wound care services on [facility]    2. Smoking cessation.  The patient is currently smoking about half a pack a day and is taking Chantix to help quit. She is encouraged to continue working towards smoking cessation.     3. Medication management.  The patient reports stomach pain likely due to antibiotics. A prescription for probiotics will be sent to [facility] to help alleviate stomach upset caused by antibiotics. The patient is also advised to eat extra yogurt to help with gut bacteria. Patient is also encouraged to continue taking Toradol prescribed to assist with pain management, understanding that all pain will not be relieved.     Follow-up in three weeks.     Patient was given instructions and counseling regarding their condition or for health maintenance advice, as well as the wound care plan and recommendations. They understand and agree with the plan.  They will follow back up here in the clinic but are instructed to contact us in the interim should they have any new, returning, or worsening symptoms or concerns. Please see specific information pulled into the AVS if appropriate.     Dragon Dictation utilized for chart completion.    I spent 50 minutes in both face-to-face and nonface-to-face time for patient care today including, but not limited to, review of old records, reviewing old images, history and physical exam, reviewing test results, counseling patient,  entering orders, coordinating care, and documenting.      Follow Up   Return in about 3 weeks (around 5/26/2025) for Wound Check.      LILY Leon    Patient or patient representative verbalized consent for the use of Ambient Listening during the visit with  LILY Leon for chart documentation. 5/6/2025  13:51 EDT

## 2025-05-06 NOTE — PROCEDURES
Procedure   Wound Vac    Performed by: Natalee Cardenas, RN  Authorized by: Sharron Piedra APRN  Associated wounds:   Wound 04/15/25 1637 Right lower breast  Wound 04/15/25 1646 Right lower abdomen    Wound Vac:     Type of Foam:  Black and White    mmH    continuous      Frequency of change:  3x weekly    Patient tolerance:  Good

## 2025-05-07 ENCOUNTER — CLINICAL SUPPORT (OUTPATIENT)
Dept: WOUND CARE | Facility: HOSPITAL | Age: 40
End: 2025-05-07
Payer: COMMERCIAL

## 2025-05-07 ENCOUNTER — TELEPHONE (OUTPATIENT)
Dept: WOUND CARE | Facility: HOSPITAL | Age: 40
End: 2025-05-07
Payer: COMMERCIAL

## 2025-05-07 VITALS
RESPIRATION RATE: 18 BRPM | SYSTOLIC BLOOD PRESSURE: 148 MMHG | TEMPERATURE: 98.2 F | DIASTOLIC BLOOD PRESSURE: 76 MMHG | HEART RATE: 77 BPM

## 2025-05-07 DIAGNOSIS — G89.18 POST-OPERATIVE PAIN: ICD-10-CM

## 2025-05-07 DIAGNOSIS — S31.109D OPEN WOUND OF ABDOMEN, SUBSEQUENT ENCOUNTER: Primary | ICD-10-CM

## 2025-05-07 DIAGNOSIS — S21.002D OPEN WOUND OF LEFT BREAST, SUBSEQUENT ENCOUNTER: ICD-10-CM

## 2025-05-07 DIAGNOSIS — S21.001D OPEN WOUND OF RIGHT FEMALE BREAST, SUBSEQUENT ENCOUNTER: ICD-10-CM

## 2025-05-07 PROCEDURE — 97606 NEG PRS WND THER DME>50 SQCM: CPT | Performed by: NURSE PRACTITIONER

## 2025-05-07 NOTE — PROGRESS NOTES
Pt arrived to clinic with wound vac off and silicone dressings in place.  Silicone dressings and wound vac removed, wounds cleansed with Vashe/normal saline, blotted dry and dressed per orders.  Right upper breast, Left lower ABD and Left breast were dressed with Aqaucel Ag and Silicone border. Right lower and medial ABD were skin prepped and draped. 1 piece of white foam filled to right lower abd tunnel at 3 and 9 o'clock with the tail visible and 2 piece of black foam to fill remainder of wound. 1 piece of black foam to Right lower breast. Wound vac sealed to 125mmHg with no leaks. Pt tolerated well.

## 2025-05-07 NOTE — TELEPHONE ENCOUNTER
Pt was called with no answer. A voice mail was left and she was notified that the appointment time was moved from 9:30 5/7/25 to 3:00pm 5/7/25.

## 2025-05-07 NOTE — PROCEDURES
Procedure   Wound Vac    Performed by: Ramya Soliz, RN  Authorized by: Sharron Piedra APRN  Associated wounds:   Wound 04/15/25 1637 Right lower breast  Wound 04/15/25 1646 Right lower abdomen    Wound Vac:     Type of Foam:  Black and White    mmH    continuous      Frequency of change:  3 times weekly    Patient tolerance:  Good

## 2025-05-09 ENCOUNTER — CLINICAL SUPPORT (OUTPATIENT)
Dept: WOUND CARE | Facility: HOSPITAL | Age: 40
End: 2025-05-09
Payer: COMMERCIAL

## 2025-05-09 VITALS
TEMPERATURE: 98.3 F | SYSTOLIC BLOOD PRESSURE: 114 MMHG | RESPIRATION RATE: 18 BRPM | HEART RATE: 67 BPM | DIASTOLIC BLOOD PRESSURE: 56 MMHG

## 2025-05-09 DIAGNOSIS — S21.001D OPEN WOUND OF RIGHT FEMALE BREAST, SUBSEQUENT ENCOUNTER: ICD-10-CM

## 2025-05-09 DIAGNOSIS — S31.109D OPEN WOUND OF ABDOMEN, SUBSEQUENT ENCOUNTER: Primary | ICD-10-CM

## 2025-05-09 DIAGNOSIS — S21.002D OPEN WOUND OF LEFT BREAST, SUBSEQUENT ENCOUNTER: ICD-10-CM

## 2025-05-09 PROCEDURE — 97606 NEG PRS WND THER DME>50 SQCM: CPT | Performed by: NURSE PRACTITIONER

## 2025-05-09 NOTE — PROCEDURES
Procedure   Wound Vac    Performed by: Rody Miguel RN  Authorized by: Sharron Piedra APRN  Associated wounds:   Wound 04/15/25 1637 Right lower breast  Wound 04/15/25 1646 Right lower abdomen    Wound Vac:     Type of Foam:  Black and White    mmH    Frequency of change:  3x weekly    Patient tolerance:  Fair

## 2025-05-09 NOTE — PROGRESS NOTES
Pt arrived today with wound VAC in place, wound VAC removed, wounds cleansed with Vashe, blot dry, white foam filled in tunnels and base of wound, wound cavity filled with black foam, and secured with transparent film and set to 125mm HG  continuous suction. Left lower abdomen dressings removed and cleansed, Aquacel Ag placed to wound base and covered and secured with silicone bordered dressing. Pt tolerated fair.

## 2025-05-12 ENCOUNTER — CLINICAL SUPPORT (OUTPATIENT)
Dept: WOUND CARE | Facility: HOSPITAL | Age: 40
End: 2025-05-12
Payer: COMMERCIAL

## 2025-05-12 VITALS
HEART RATE: 75 BPM | RESPIRATION RATE: 16 BRPM | SYSTOLIC BLOOD PRESSURE: 109 MMHG | TEMPERATURE: 98.1 F | DIASTOLIC BLOOD PRESSURE: 56 MMHG

## 2025-05-12 DIAGNOSIS — S31.109D OPEN WOUND OF ABDOMEN, SUBSEQUENT ENCOUNTER: Primary | ICD-10-CM

## 2025-05-12 DIAGNOSIS — S21.001D OPEN WOUND OF RIGHT FEMALE BREAST, SUBSEQUENT ENCOUNTER: ICD-10-CM

## 2025-05-12 DIAGNOSIS — S21.002D OPEN WOUND OF LEFT BREAST, SUBSEQUENT ENCOUNTER: ICD-10-CM

## 2025-05-12 PROCEDURE — 97606 NEG PRS WND THER DME>50 SQCM: CPT | Performed by: NURSE PRACTITIONER

## 2025-05-12 NOTE — PROCEDURES
Procedure   Wound Vac    Performed by: Roxi Rock RN  Authorized by: Sharron Piedra APRN  Associated wounds:   Wound 04/15/25 1637 Right lower breast  Wound 04/15/25 1646 Right lower abdomen    Wound Vac:     Type of Foam:  Black and White    mmH    continuous      Frequency of change:  3 times a week    Patient tolerance:  Discomfort

## 2025-05-12 NOTE — PROGRESS NOTES
Pt arrived today with wound VAC in place, wound VAC removed, wounds cleansed with Vashe, blot dry, white foam filled in tunnels and base of wound, wound cavity filled with black foam, and secured with transparent film and set to 125mmHG continuous suction. Used Lidocaine in wound bed of right abdomen wound to help with discomfort during foam removal. Right breast and left lower abdomen dressings removed, wounds cleansed, Aquacel Ag placed to wound base and covered and secured with silicone bordered dressing. Pt tolerated fair.    36.7

## 2025-05-14 ENCOUNTER — PATIENT MESSAGE (OUTPATIENT)
Dept: WOUND CARE | Facility: HOSPITAL | Age: 40
End: 2025-05-14
Payer: COMMERCIAL

## 2025-05-14 ENCOUNTER — TELEPHONE (OUTPATIENT)
Dept: WOUND CARE | Facility: HOSPITAL | Age: 40
End: 2025-05-14
Payer: COMMERCIAL

## 2025-05-14 DIAGNOSIS — T81.31XS POSTOPERATIVE WOUND DEHISCENCE, SEQUELA: ICD-10-CM

## 2025-05-14 DIAGNOSIS — Z98.82 S/P BREAST AUGMENTATION: ICD-10-CM

## 2025-05-14 DIAGNOSIS — S21.001D OPEN WOUND OF RIGHT FEMALE BREAST, SUBSEQUENT ENCOUNTER: ICD-10-CM

## 2025-05-14 DIAGNOSIS — S21.002D OPEN WOUND OF LEFT BREAST, SUBSEQUENT ENCOUNTER: ICD-10-CM

## 2025-05-14 DIAGNOSIS — Z98.890 S/P ABDOMINOPLASTY: ICD-10-CM

## 2025-05-14 DIAGNOSIS — S31.109D OPEN WOUND OF ABDOMEN, SUBSEQUENT ENCOUNTER: Primary | ICD-10-CM

## 2025-05-14 DIAGNOSIS — T86.821 FAILED FLAP: ICD-10-CM

## 2025-05-14 NOTE — TELEPHONE ENCOUNTER
This writer reached out to pt, no answer when call was made. Voice mail was left to let pt know that they missed their 9:30 appointment and to call the clinic.

## 2025-05-15 NOTE — PROGRESS NOTES
Patient was unable to make her appointment with outpatient wound care services today. She has been demonstrating difficulties with adhering to appointment times and has reported difficulties with transportation and family constraints. During prior conversations, Outpatient services through [facility], more local to patient, has been offered and even initiated, however patient requested to remain with outpatient services at the [facility] in [place]. Due to ongoing constraints and challenges, however, patient is requesting a referral for Home Health Services. This referral was placed today in an attempt to better accommodate needs and achieve desired outcomes for patient's wounds.

## 2025-05-16 ENCOUNTER — CLINICAL SUPPORT (OUTPATIENT)
Dept: WOUND CARE | Facility: HOSPITAL | Age: 40
End: 2025-05-16
Payer: COMMERCIAL

## 2025-05-16 VITALS
RESPIRATION RATE: 18 BRPM | HEART RATE: 70 BPM | TEMPERATURE: 97.6 F | DIASTOLIC BLOOD PRESSURE: 68 MMHG | SYSTOLIC BLOOD PRESSURE: 128 MMHG

## 2025-05-16 DIAGNOSIS — S31.109D OPEN WOUND OF ABDOMEN, SUBSEQUENT ENCOUNTER: Primary | ICD-10-CM

## 2025-05-16 DIAGNOSIS — S21.002D OPEN WOUND OF LEFT BREAST, SUBSEQUENT ENCOUNTER: ICD-10-CM

## 2025-05-16 DIAGNOSIS — S21.001D OPEN WOUND OF RIGHT FEMALE BREAST, SUBSEQUENT ENCOUNTER: ICD-10-CM

## 2025-05-16 PROCEDURE — 97606 NEG PRS WND THER DME>50 SQCM: CPT | Performed by: NURSE PRACTITIONER

## 2025-05-16 NOTE — PROCEDURES
Procedure   Wound Vac    Performed by: Rody Miguel, RN  Authorized by: Sharron Piedra APRN  Associated wounds:   Wound 04/15/25 1637 Right lower breast  Wound 04/15/25 1646 Right lower abdomen  Wound 04/15/25 1654 Left lower abdomen    Wound Vac:     Type of Foam:  Black and White    mmH    continuous      Frequency of change:  3x weekly    Patient tolerance:  Good

## 2025-05-16 NOTE — PROGRESS NOTES
Pt arrived today with wound VAC off, no packing in place and wounds covered with pink pad. Pt stated she removed the VAC on Wednesday and has been packing and covering the wounds however she showered before arrival today and did not apply a bandage. Wounds cleansed with Vashe, blot dry, white foam filled in tunnels and base of wound, wound cavity filled with black foam, and secured with transparent film and set to 125mm HG  continuous suction. Left lower abdomen dressings removed and cleansed, Aquacel Ag placed to wound base and covered and secured with silicone bordered dressing. Pt tolerated fair.

## 2025-05-19 ENCOUNTER — TELEPHONE (OUTPATIENT)
Dept: WOUND CARE | Facility: HOSPITAL | Age: 40
End: 2025-05-19
Payer: COMMERCIAL

## 2025-05-19 DIAGNOSIS — R52 PAIN ASSOCIATED WITH WOUND: Primary | ICD-10-CM

## 2025-05-19 DIAGNOSIS — T14.8XXA PAIN ASSOCIATED WITH WOUND: Primary | ICD-10-CM

## 2025-05-19 NOTE — TELEPHONE ENCOUNTER
Pt was called and notified that [facility] rep Stef Man has been contacted about pt supply orders. Stef said he was going to look into order and contact pt directly with an update. Pt was also concerned with possible leaking of the canisters, I advised pt to change canister if it is leaking and to notify Stef when he calls. No other issues were reported from pt at this time.

## 2025-05-19 NOTE — TELEPHONE ENCOUNTER
Called pt to inform her that lidocaine has been called in to assist with dressing changes. Insurance may or may not cover RX but over the counter lidocaine gel may be used if it is not covered by insurance.      Also called to discuss wound VAC supplies on hand dt wound VAC supplies being ordered on multiple days     5/9/25- dermatac tape, blackfoam, canisters, Y connector and white foam    5/16/25- foam, canisters, Y connector and white foam     5/19/25- black foam     pt to return call in regards to supplies and lidocaine. No answer,  left.     HH informed of lidocaine and supplies concerns.

## 2025-05-20 ENCOUNTER — DOCUMENTATION (OUTPATIENT)
Dept: WOUND CARE | Facility: HOSPITAL | Age: 40
End: 2025-05-20
Payer: COMMERCIAL

## 2025-05-20 ENCOUNTER — TELEPHONE (OUTPATIENT)
Dept: WOUND CARE | Facility: HOSPITAL | Age: 40
End: 2025-05-20
Payer: COMMERCIAL

## 2025-05-20 NOTE — TELEPHONE ENCOUNTER
Natalee from [facility] (Wound VAC Rep), called stating they are having a difficult time contacting the patient. They have called multiple times and left multiple messages for the patient to return their call.     [facility] is trying to send the patient a new wound VAC because the patient called them stating there was an issue with the VAC.     [facility] wanted to send the VAC out today (5/20/25) but they can not send the VAC without knowing someone will be there to sign for the wound VAC. If they are unable to contact the patient today they will place it to be sent out tomorrow but a signature will still be required to drop it off.     A call was made to try to contact patient, no answer, LM for pt to return my call or return the call to [facility] for VAC delivery.

## 2025-05-20 NOTE — PROGRESS NOTES
Late entry from 05/19/25 10:00am:  called patient back to let her know she can come by wound center to  white foam after her pain management appt.  Also, let her know Dr. Haile sent in RX for lidocaine gel and if not covered by insurance, she can buy lidocaine gel over the counter.

## 2025-05-28 ENCOUNTER — TELEPHONE (OUTPATIENT)
Dept: WOUND CARE | Facility: HOSPITAL | Age: 40
End: 2025-05-28

## 2025-05-28 ENCOUNTER — OFFICE VISIT (OUTPATIENT)
Dept: WOUND CARE | Facility: HOSPITAL | Age: 40
End: 2025-05-28
Payer: COMMERCIAL

## 2025-05-28 VITALS
HEART RATE: 71 BPM | SYSTOLIC BLOOD PRESSURE: 121 MMHG | TEMPERATURE: 100.5 F | RESPIRATION RATE: 16 BRPM | DIASTOLIC BLOOD PRESSURE: 56 MMHG

## 2025-05-28 DIAGNOSIS — T81.31XS POSTOPERATIVE WOUND DEHISCENCE, SEQUELA: ICD-10-CM

## 2025-05-28 DIAGNOSIS — T14.8XXA PAIN ASSOCIATED WITH WOUND: ICD-10-CM

## 2025-05-28 DIAGNOSIS — S31.109D OPEN WOUND OF ABDOMEN, SUBSEQUENT ENCOUNTER: Primary | ICD-10-CM

## 2025-05-28 DIAGNOSIS — R52 PAIN ASSOCIATED WITH WOUND: ICD-10-CM

## 2025-05-28 DIAGNOSIS — S21.001D OPEN WOUND OF RIGHT FEMALE BREAST, SUBSEQUENT ENCOUNTER: ICD-10-CM

## 2025-05-28 DIAGNOSIS — Z98.82 S/P BREAST AUGMENTATION: ICD-10-CM

## 2025-05-28 DIAGNOSIS — H65.112 NON-RECURRENT ACUTE ALLERGIC OTITIS MEDIA OF LEFT EAR: ICD-10-CM

## 2025-05-28 DIAGNOSIS — Z98.890 S/P ABDOMINOPLASTY: ICD-10-CM

## 2025-05-28 DIAGNOSIS — T86.821 FAILED FLAP: ICD-10-CM

## 2025-05-28 DIAGNOSIS — H61.21 IMPACTED CERUMEN OF RIGHT EAR: ICD-10-CM

## 2025-05-28 PROCEDURE — 97606 NEG PRS WND THER DME>50 SQCM: CPT | Performed by: NURSE PRACTITIONER

## 2025-05-28 PROCEDURE — 1160F RVW MEDS BY RX/DR IN RCRD: CPT | Performed by: NURSE PRACTITIONER

## 2025-05-28 PROCEDURE — 1159F MED LIST DOCD IN RCRD: CPT | Performed by: NURSE PRACTITIONER

## 2025-05-28 RX ORDER — OFLOXACIN 3 MG/ML
5 SOLUTION AURICULAR (OTIC) 2 TIMES DAILY
Qty: 5 ML | Refills: 0 | Status: SHIPPED | OUTPATIENT
Start: 2025-05-28 | End: 2025-06-04

## 2025-05-28 RX ORDER — SULFAMETHOXAZOLE AND TRIMETHOPRIM 800; 160 MG/1; MG/1
1 TABLET ORAL EVERY 12 HOURS SCHEDULED
COMMUNITY
Start: 2025-04-25

## 2025-05-28 NOTE — PROGRESS NOTES
Chief Complaint  Wound Check (Follow up on abdominal and breast wounds. Pt has been packing daily with ns moist gauze.  )    Subjective      History of Present Illness    Ana Weathers  is a 40 y.o. female   History of Present Illness  The patient presents today for a follow-up of dehisced surgical wounds to the abdomen and breast.She is s/p Abdominoplasty, breast augmentation and BBL with lipo suction performed in [place] on 03/21/2025     She has been doing NPWT therapy 3 x week until recently, when she was unable to make her appointments. The wound vac was then removed and NS dressings have been done daily and as needed since that time. She has had the assistance of her daughter and other family members with performing wound care.     We had attempted to get her set-up with Home Health to ease her burden with transportation and to facilitate adherence to treatment, however the admission could not be completed and therefore Home Health declined to accept patient. No other Home Health Agency was willing to accept her insurance.     Despite these complications, she reports an improvement in her condition, with a decrease in malodor from the wound. She notes that the wound has been slightly bloody recently and has been experiencing itching, which she believes is a positive sign of healing. She has not been using a binder since it was removed but plans to start using it again today. She expresses concern about the cost of wound wash, as she requires a bottle daily.    She had some residual Bactrim at home which she took yesterday and the day before.  She experienced a low-grade fever and took 2 doses of Bactrim as a precautionary measure.      She has an appointment with her doctor tomorrow for anxiety or depression, but it is virtual. She is additionally complaining of pain in her left ear that she believes may be causing her low grade temp.    She continues to smoke but is working towards reducing her tobacco use.  She quit taking Chantix which has helped stop the dreams she was experiencing causing.    She did go to her Pain Management appointment, however they stated they could not help her and that she needed to continue with the Methadone clinic.     Supplemental Information  She feels like she has an ear infection.       Allergies:  Codeine      Current Outpatient Medications:     buPROPion SR (WELLBUTRIN SR) 150 MG 12 hr tablet, Take  by mouth 2 (Two) Times a Day., Disp: , Rfl:     methadone (DOLOPHINE) 10 MG/ML solution, Take 10.5 mL by mouth Daily., Disp: , Rfl:     sulfamethoxazole-trimethoprim (BACTRIM DS,SEPTRA DS) 800-160 MG per tablet, Take 1 tablet by mouth Every 12 (Twelve) Hours., Disp: , Rfl:     carbamide peroxide (Debrox) 6.5 % otic solution, Administer 5 drops to the right ear 2 (Two) Times a Day for 4 days., Disp: 15 mL, Rfl: 0    cyclobenzaprine (FLEXERIL) 10 MG tablet, Take 1 tablet by mouth 3 (Three) Times a Day As Needed for Muscle Spasms. (Patient not taking: Reported on 4/15/2025), Disp: 15 tablet, Rfl: 0    diclofenac (VOLTAREN) 50 MG EC tablet, Take 1 tablet by mouth 2 (Two) Times a Day As Needed (pain). (Patient not taking: Reported on 4/15/2025), Disp: 15 tablet, Rfl: 0    HYDROcodone-acetaminophen (NORCO) 5-325 MG per tablet, Take 1 tablet by mouth Every 6 (Six) Hours As Needed for Severe Pain . (Patient not taking: Reported on 5/28/2025), Disp: 10 tablet, Rfl: 0    lidocaine (XYLOCAINE) 2 % jelly, Apply  topically to the appropriate area as directed As Needed for Moderate Pain. Apply sparingly around edges for wound vac changes 3x/week (Patient not taking: Reported on 5/28/2025), Disp: 85 g, Rfl: 2    ofloxacin (FLOXIN) 0.3 % otic solution, Administer 5 drops into the left ear 2 (Two) Times a Day for 7 days., Disp: 5 mL, Rfl: 0    ondansetron ODT (ZOFRAN-ODT) 4 MG disintegrating tablet, Place 1 tablet on the tongue Every 4 (Four) Hours As Needed for Nausea or Vomiting. (Patient not taking:  Reported on 4/15/2025), Disp: 15 tablet, Rfl: 0    QUEtiapine fumarate ER (SEROquel XR) 50 MG tablet sustained-release 24 hour tablet, Take  by mouth Every Night. (Patient not taking: Reported on 2025), Disp: , Rfl:     saccharomyces boulardii (Florastor) 250 MG capsule, Take 1 capsule by mouth 2 (Two) Times a Day for 30 days. (Patient not taking: Reported on 2025), Disp: 60 capsule, Rfl: 0    Topiramate (TOPAMAX PO), Take  by mouth. (Patient not taking: Reported on 4/15/2025), Disp: , Rfl:     Past Medical History:   Diagnosis Date    Bipolar 1 disorder     Cigarette smoker motivated to quit     Depression      Past Surgical History:   Procedure Laterality Date     SECTION      x 6    CHOLECYSTECTOMY      REPLACEMENT TOTAL HIP LATERAL POSITION       Social History     Socioeconomic History    Marital status:    Tobacco Use    Smoking status: Every Day     Current packs/day: 1.00     Types: Cigarettes     Passive exposure: Current   Vaping Use    Vaping status: Former   Substance and Sexual Activity    Alcohol use: Never    Drug use: Yes     Types: Marijuana     Comment: Pr states she smokes marijuana some over the past couple of days    Sexual activity: Defer       Objective     Vitals:    25 1318   BP: 121/56   BP Location: Left arm   Patient Position: Lying   Cuff Size: Large Adult   Pulse: 71   Resp: 16  Comment: O2 sat 100%   Temp: 100.5 °F (38.1 °C)   PainSc: 4    PainLoc: Umbilicus     There is no height or weight on file to calculate BMI.    The following data has been reviewed by LILY Leon on 2025       Culture results from last 30 days   Lab 25  1752   WOUNDCX Light growth (2+) Streptococcus agalactiae (Group B)*  Light growth (2+) Citrobacter braakii*  Light growth (2+) Enterobacter cloacae complex*  Light growth (2+) Normal Skin Emi        No Images in the past 120 days found..     STEADI Fall Risk Assessment has not been completed.     Review  of Systems     ROS:  Per HPI.     I have reviewed the HPI and ROS as documented by MA/RN. LILY Leon    Physical Exam     NAD  AAOx3, pleasant, cooperative    Physical Exam  Right breast: The transverse incision along the right inferior breast has significantly decreased in size, with healthy red granular tissue noted within the wound base. Small amount of serosanguinous drainage. No erythema, and decreased tenderness.     Abdomen: The right abdominal incision shows near complete continuous dehiscence with only one small area of tissue  the two wound areas. A small amount of residual adipose tissue is visible within the wound base. There is a remaining deep tunnel from the right medial innermost margin to the left aspect of the abdomen. Healthy pink and pale moist tissue is noted within the wound base. The wound margins have significantly contracted and deeper tissues have attached to the abdominal base, decreasing the entire cavity significantly. A small amount of serous and serosanguineous drainage is present. No odor was detected today from the wound base after cleansing. Tenderness and pain have decreased.    Wounds to the right medial breast, left breast and left abdomen have epithelialized. There is minimal crusting and some areas have very thin tissue with potentially underlying sutures.    See photos for details.    Right breast:       Right abdomen:               Result Review :  The following data was reviewed by: LILY Leon on 05/28/2025:    Prior wound carenotes and images.      Assessment and Plan   Diagnoses and all orders for this visit:    1. Open wound of abdomen, subsequent encounter (Primary)  -     Wound Vac  -     Ambulatory Referral to Wound Clinic    2. Open wound of right female breast, subsequent encounter  -     Wound Vac  -     Ambulatory Referral to Wound Clinic    3. Postoperative wound dehiscence, sequela  -     Ambulatory Referral to Wound Clinic    4.  Failed flap  -     Ambulatory Referral to Wound Clinic    5. S/P abdominoplasty  -     Ambulatory Referral to Wound Clinic    6. S/P breast augmentation  -     Ambulatory Referral to Wound Clinic    7. Pain associated with wound  -     Ambulatory Referral to Wound Clinic    8. Non-recurrent acute allergic otitis media of left ear  -     ofloxacin (FLOXIN) 0.3 % otic solution; Administer 5 drops into the left ear 2 (Two) Times a Day for 7 days.  Dispense: 5 mL; Refill: 0    9. Impacted cerumen of right ear  -     carbamide peroxide (Debrox) 6.5 % otic solution; Administer 5 drops to the right ear 2 (Two) Times a Day for 4 days.  Dispense: 15 mL; Refill: 0      Assessment & Plan  1. Dehisced surgical wounds.  The patient's dehisced surgical wounds on the abdomen and breast were evaluated. Both wounds have shown significant improvement, with a decrease in depth and narrowing of the wound. There is a small amount of serosanguinous drainage and serous drainage noted from the wounds today. There is no odor and there are no signs of infection today.  She was advised to continue monitoring the wounds for any signs of infection, such as increased redness, swelling, or foul odor directly from the wound base after irrigation.  She was instructed to avoid taking Bactrim as needed to prevent resistance buildup.   Recommending that the patient resume NPWT therapy 3 x week to expedite her healing. Orders as per below.   Orders will be sent to [facility] for approval for NPWT dressing changes at a local facility. A backup appointment will be scheduled for Friday and Monday if approval is not obtained by their facility.   Patient should continue wearing her abdominal binder for support and to prevent hernia formation.     VAC Instructions:   Clean wound with normal saline or wound cleanser.  Pat dry thoroughly.    Apply Skin-Prep to skin surrounding wound and then apply protective drape to surrounding periwound skin.    Place white foam within the tunnel of the abdominal wound at 2 - 3 o'clock (Please do not overstuff) and to the depth of the right breast wound.   Fill remaining space within the wound cavities with black foam all the way to the surface.  Do not allow foam to come in contact with any healthy skin.   Cover black foam with drape and cut small hole in center.   ApplyTRAC pad and tubing over hole and reinforce edges to ensure no leaks. OR, bridge with additional foam to get TRAC individual pads and connect to y-site connector. Keep tubing away from sensitive or weight bearing areas if necessary.   Attach to continuous suction at 125 mmHg.   Change 3 times per week.   Be sure the patient is not lying, standing, or sitting directly on the tubing.  Gauze or ABD pads may be used to further pad the tubing etc. to prevent pressure injury from the device.     2. Anxiety and depression.  The patient mentioned having an upcoming virtual appointment with her doctor to discuss anxiety and depression. She was encouraged to keep this appointment to address her mental health concerns.    3. Pain management.  The patient expressed frustration with her current pain management regimen and the inability of the previous clinic to manage her methadone treatment or pain.   Will attempt to call the local pain management clinics to determine if they are able to assist and provide necessary treatment.     Follow-up in two weeks.       Patient was given instructions and counseling regarding their condition or for health maintenance advice, as well as the wound care plan and recommendations. They understand and agree with the plan.  They will follow back up here in the clinic but are instructed to contact us in the interim should they have any new, returning, or worsening symptoms or concerns. Please see specific information pulled into the AVS if appropriate.     Dragon Dictation utilized for chart completion.    I spent 65 minutes in both  face-to-face and nonface-to-face time for patient care today including, but not limited to, review of old records, reviewing old images, history and physical exam, reviewing test results, counseling patient, entering orders, coordinating care, and documenting.      Follow Up   Return in about 2 weeks (around 6/11/2025) for Wound Check.      LILY Leon    Patient or patient representative verbalized consent for the use of Ambient Listening during the visit with  LILY eLon for chart documentation. 5/28/2025  16:25 EDT

## 2025-05-28 NOTE — TELEPHONE ENCOUNTER
Left message with Stef at [facility] to order pt y connector, white and black foam for wound vac. Also stated pt will be going to [facility] for npwt dressing changes 3x week.

## 2025-06-02 DIAGNOSIS — S21.001S: ICD-10-CM

## 2025-06-02 DIAGNOSIS — T81.31XS POSTOPERATIVE WOUND DEHISCENCE, SEQUELA: ICD-10-CM

## 2025-06-02 DIAGNOSIS — T14.8XXA PAIN ASSOCIATED WITH WOUND: ICD-10-CM

## 2025-06-02 DIAGNOSIS — Z98.82 S/P BREAST AUGMENTATION: ICD-10-CM

## 2025-06-02 DIAGNOSIS — S31.109S OPEN WOUND OF ABDOMEN, SEQUELA: Primary | ICD-10-CM

## 2025-06-02 DIAGNOSIS — R52 PAIN ASSOCIATED WITH WOUND: ICD-10-CM

## 2025-06-02 DIAGNOSIS — Z98.890 S/P ABDOMINOPLASTY: ICD-10-CM

## 2025-06-02 DIAGNOSIS — T86.821 FAILED FLAP: ICD-10-CM

## 2025-06-02 NOTE — PROGRESS NOTES
Call was placed by RN to [facility]. They will review referral for possible treatment. It is possible they will not see / treat patient due to current Methadone therapy.  LILY Leon

## 2025-06-10 ENCOUNTER — OFFICE VISIT (OUTPATIENT)
Dept: WOUND CARE | Facility: HOSPITAL | Age: 40
End: 2025-06-10
Payer: COMMERCIAL

## 2025-06-10 VITALS — TEMPERATURE: 98.1 F | DIASTOLIC BLOOD PRESSURE: 63 MMHG | HEART RATE: 81 BPM | SYSTOLIC BLOOD PRESSURE: 122 MMHG

## 2025-06-10 DIAGNOSIS — Z98.890 S/P ABDOMINOPLASTY: ICD-10-CM

## 2025-06-10 DIAGNOSIS — S21.001S: ICD-10-CM

## 2025-06-10 DIAGNOSIS — S31.109S OPEN WOUND OF ABDOMEN, SEQUELA: Primary | ICD-10-CM

## 2025-06-10 DIAGNOSIS — T81.31XS POSTOPERATIVE WOUND DEHISCENCE, SEQUELA: ICD-10-CM

## 2025-06-10 DIAGNOSIS — T86.821 FAILED FLAP: ICD-10-CM

## 2025-06-10 DIAGNOSIS — Z98.82 S/P BREAST AUGMENTATION: ICD-10-CM

## 2025-06-10 PROCEDURE — 1160F RVW MEDS BY RX/DR IN RCRD: CPT | Performed by: NURSE PRACTITIONER

## 2025-06-10 PROCEDURE — 1159F MED LIST DOCD IN RCRD: CPT | Performed by: NURSE PRACTITIONER

## 2025-06-10 PROCEDURE — 97606 NEG PRS WND THER DME>50 SQCM: CPT | Performed by: NURSE PRACTITIONER

## 2025-06-10 NOTE — PROGRESS NOTES
Chief Complaint  Wound Check (Follow up on breast and abdominal wounds. Pt took vac off on Friday and has been packing with NS wet to dry daily. )    Subjective      History of Present Illness    Ana Weathers  is a 40 y.o. female   History of Present Illness  The patient presents today for a follow-up of wounds to the right breast and abdomen status post plastic surgery procedures. She is accompanied by her young son.    The patient presents today for a follow-up of dehisced surgical wounds to the abdomen and breast.She is s/p Abdominoplasty, breast augmentation and BBL with lipo suction performed in [place] on 03/21/2025     Patient had been receiving NP WT therapy 3 times per week through an outlying outpatient facility up until last Friday.  Since that time she has been doing normal saline dressings daily to both the breast and the abdominal wound.    She reports a sensation of bruising around the wound area of the abdomen, which she finds alarming. She has been applying Neosporin to the incision lines on her left breast.      She  mentions a tunnel within the abdominal wound was identified at the outpatient facility, which she does not recall being present during her last visit. Initially it measured 10 cm, it has since reduced in size.   She expresses a preference for the wound VAC vs dressings to the abdominal wound as it reduces messiness and allows her to wear better clothing. She notes that the wound is no longer as deep as before and that the patch area falls off when she stands up.     She was able to speak with her PCP concerning her mental health concerns.   She is currently on a regimen of BuSpar, administered three times daily, and Wellbutrin, taken once daily.    She reports an improvement in her ear condition since her last visit and has been using Q-tips for cleaning, although she notes a significant amount of wax still present. She has not completed the course of antibiotic drops to her left  ear and states she is continuing to use them. She also continues to have Debrox to utilize for the cerumen build up of the right ear.    MEDICATIONS  Current: Wellbutrin, BuSpar    Allergies:  Codeine      Current Outpatient Medications:     buPROPion SR (WELLBUTRIN SR) 150 MG 12 hr tablet, Take  by mouth 2 (Two) Times a Day., Disp: , Rfl:     cyclobenzaprine (FLEXERIL) 10 MG tablet, Take 1 tablet by mouth 3 (Three) Times a Day As Needed for Muscle Spasms. (Patient not taking: Reported on 6/10/2025), Disp: 15 tablet, Rfl: 0    diclofenac (VOLTAREN) 50 MG EC tablet, Take 1 tablet by mouth 2 (Two) Times a Day As Needed (pain). (Patient not taking: Reported on 6/10/2025), Disp: 15 tablet, Rfl: 0    HYDROcodone-acetaminophen (NORCO) 5-325 MG per tablet, Take 1 tablet by mouth Every 6 (Six) Hours As Needed for Severe Pain . (Patient not taking: Reported on 4/17/2025), Disp: 10 tablet, Rfl: 0    lidocaine (XYLOCAINE) 2 % jelly, Apply  topically to the appropriate area as directed As Needed for Moderate Pain. Apply sparingly around edges for wound vac changes 3x/week (Patient not taking: Reported on 6/10/2025), Disp: 85 g, Rfl: 2    methadone (DOLOPHINE) 10 MG/ML solution, Take 10.5 mL by mouth Daily., Disp: , Rfl:     ondansetron ODT (ZOFRAN-ODT) 4 MG disintegrating tablet, Place 1 tablet on the tongue Every 4 (Four) Hours As Needed for Nausea or Vomiting. (Patient not taking: Reported on 6/10/2025), Disp: 15 tablet, Rfl: 0    QUEtiapine fumarate ER (SEROquel XR) 50 MG tablet sustained-release 24 hour tablet, Take  by mouth Every Night. (Patient not taking: Reported on 6/10/2025), Disp: , Rfl:     sulfamethoxazole-trimethoprim (BACTRIM DS,SEPTRA DS) 800-160 MG per tablet, Take 1 tablet by mouth Every 12 (Twelve) Hours., Disp: , Rfl:     Topiramate (TOPAMAX PO), Take  by mouth. (Patient not taking: Reported on 6/10/2025), Disp: , Rfl:     Past Medical History:   Diagnosis Date    Bipolar 1 disorder     Cigarette smoker  motivated to quit     Depression      Past Surgical History:   Procedure Laterality Date     SECTION      x 6    CHOLECYSTECTOMY      REPLACEMENT TOTAL HIP LATERAL POSITION       Social History     Socioeconomic History    Marital status:    Tobacco Use    Smoking status: Every Day     Current packs/day: 1.00     Types: Cigarettes     Passive exposure: Current   Vaping Use    Vaping status: Former   Substance and Sexual Activity    Alcohol use: Never    Drug use: Yes     Types: Marijuana     Comment: Pr states she smokes marijuana some over the past couple of days    Sexual activity: Defer         Objective     Vitals:    06/10/25 1320   BP: 122/63   BP Location: Left arm   Patient Position: Lying   Cuff Size: Large Adult   Pulse: 81   Temp: 98.1 °F (36.7 °C)   PainSc: 6    PainLoc: Abdomen     There is no height or weight on file to calculate BMI.    The following data has been reviewed by LILY Leon on 06/10/2025     STEADI Fall Risk Assessment has not been completed.     Review of Systems     ROS:  Per HPI.     I have reviewed the HPI and ROS as documented by MA/RN. LILY Leon    Physical Exam     NAD  AAOx3, pleasant, cooperative    Physical Exam  Right breast: The open wound along the right inferior breast is much smaller with a healthy red moist wound base. Depth has significantly decreased and wound margins are alba. Scant amount of serosanguinous drainage. No signs of infection today. No TTP.  Right central breast incision line is approximated and epithelialized.     Abdomen: The open abdominal wound along the medial and right lower abdomen is consolidated into one larger wound opening. The superior and inferior wound edges are attaching within the base decreasing the entire size of the cavity. One tunnel remains at approximately 2-3 o'clock. Wound base has pink moist tissues and intermittently dry, pink tissue. No purulence, no erythema or edema, no active signs  of infection. No erythema or induration.   Small amount of scattered crusting along the left lateral abdominal incision line. No open tissue.     Left breast: Complete epithelialization along the left breast incision lines.      See photos for details.    Right breast:      Abdomen:        Result Review :  The following data was reviewed by: LILY Leon on 06/10/2025:    Prior wound care notes and images.    Assessment and Plan   Diagnoses and all orders for this visit:    1. Open wound of abdomen, sequela (Primary)    2. Open wound of right female breast, sequela    3. Postoperative wound dehiscence, sequela    4. Failed flap    5. S/P abdominoplasty    6. S/P breast augmentation      Assessment & Plan  1. Postoperative wounds on the right breast and abdomen.  Overall the wounds are continuing to improve with a decrease in overall size.  The open abdominal wound along the medial and right lower abdomen has consolidated into one larger wound opening. The superior and inferior wound edges are now attaching along the base, decreasing the entire size of the cavity. One tunnel remains at approximately 2-3 o'clock. The wound base has pink moist tissues and intermittently dry, pink tissue. There are no signs of infection today.   After discussion with the patient, recommending that the patient begin daily dressing changes of the right breast, that consist of cleansing, blotting dry, followed by application of iodoform packing strip and a dry dressing.   Patient to resume NPWT therapy to the abdomen 3 x week. White foam to continue to be placed within the tunnel and then black foam to the remainder of the wound base.     She was advised to discontinue application of Neosporin along the approximated incision lines due to lack of infection and instead to apply Vaseline. Very gentle massage can be performed along the central abdomen where hardening of the underlying tissue is experienced proximal to the umbilicus.  She was cautioned against any aggressive massaging or rubbing to avoid interruption of the continued healing process.      2. Medication management.  She is currently taking BuSpar 10 mg three times a day and Wellbutrin 150 mg once a day. She was advised to continue her current medication regimen.    3. Ear wax buildup.  She reported improvement in her ears but still experiences some blockage. She was advised to use Debrox drops to thin the ear wax as needed.    Follow-up  The patient will follow up in 2 weeks.      Patient was given instructions and counseling regarding their condition or for health maintenance advice, as well as the wound care plan and recommendations. They understand and agree with the plan.  They will follow back up here in the clinic but are instructed to contact us in the interim should they have any new, returning, or worsening symptoms or concerns. Please see specific information pulled into the AVS if appropriate.     Dragon Dictation utilized for chart completion.    I spent 35 minutes in both face-to-face and nonface-to-face time for patient care today including, but not limited to, review of old records, reviewing old images, history and physical exam, reviewing test results, counseling patient, entering orders, coordinating care, and documenting.      Follow Up   Return in about 2 weeks (around 6/24/2025) for Wound Check.      LILY Leon    Patient or patient representative verbalized consent for the use of Ambient Listening during the visit with  LILY Leon for chart documentation. 6/10/2025  14:40 EDT

## 2025-06-16 DIAGNOSIS — S31.109S OPEN WOUND OF ABDOMEN, SEQUELA: Primary | ICD-10-CM

## 2025-06-16 DIAGNOSIS — Z98.890 S/P ABDOMINOPLASTY: ICD-10-CM

## 2025-06-16 DIAGNOSIS — T81.31XS POSTOPERATIVE WOUND DEHISCENCE, SEQUELA: ICD-10-CM

## 2025-06-16 DIAGNOSIS — L03.311 CELLULITIS OF ABDOMINAL WALL: ICD-10-CM

## 2025-06-16 DIAGNOSIS — T86.821 FAILED FLAP: ICD-10-CM

## 2025-06-16 RX ORDER — LEVOFLOXACIN 750 MG/1
750 TABLET, FILM COATED ORAL DAILY
Qty: 7 TABLET | Refills: 0 | Status: SHIPPED | OUTPATIENT
Start: 2025-06-16 | End: 2025-06-23

## 2025-06-16 NOTE — PROGRESS NOTES
Patient with reports of increased redness, heat and firmness to the abdomen surrounding the umbilical region. She also reports a low grade temp. Due to ongoing concerns a CT of abdomen ad pelvis has been ordered. Based on prior wound culture results, a 7 day regimen of Levaquin also being sent to the Pharmacy.   LILY Leon

## 2025-06-20 ENCOUNTER — TELEPHONE (OUTPATIENT)
Dept: WOUND CARE | Facility: HOSPITAL | Age: 40
End: 2025-06-20
Payer: COMMERCIAL

## 2025-06-20 NOTE — TELEPHONE ENCOUNTER
Call placed to patient to discuss CT results. Findings suggest possible Seroma or Hematoma superficial to the fascia. No acute findings. Continue with current antibiotic regimen.   Patient reports continued tenderness but overall improvement since on antibiotics.   She reports that she has reviewed and discussed findings with Plastics and that no further follow-up or communications with Plastics on my part are needed at this time.   Will continue to follow and monitor for improvement at her next follow-up.   May need to consider longer term antibiotic to allow for complete healing pending progress.   Patient to contact clinic or provider with any new concerns   LILY Leon

## 2025-06-22 ENCOUNTER — PATIENT MESSAGE (OUTPATIENT)
Dept: WOUND CARE | Facility: HOSPITAL | Age: 40
End: 2025-06-22
Payer: COMMERCIAL

## 2025-06-25 ENCOUNTER — OFFICE VISIT (OUTPATIENT)
Dept: WOUND CARE | Facility: HOSPITAL | Age: 40
End: 2025-06-25
Payer: COMMERCIAL

## 2025-06-25 VITALS
HEART RATE: 72 BPM | DIASTOLIC BLOOD PRESSURE: 50 MMHG | SYSTOLIC BLOOD PRESSURE: 114 MMHG | TEMPERATURE: 98.5 F | RESPIRATION RATE: 18 BRPM

## 2025-06-25 DIAGNOSIS — Z98.82 S/P BREAST AUGMENTATION: ICD-10-CM

## 2025-06-25 DIAGNOSIS — T81.31XS POSTOPERATIVE WOUND DEHISCENCE, SEQUELA: Primary | ICD-10-CM

## 2025-06-25 DIAGNOSIS — S21.001S: ICD-10-CM

## 2025-06-25 DIAGNOSIS — S31.109S OPEN WOUND OF ABDOMEN, SEQUELA: ICD-10-CM

## 2025-06-25 DIAGNOSIS — T86.821 FAILED FLAP: ICD-10-CM

## 2025-06-25 DIAGNOSIS — Z98.890 S/P ABDOMINOPLASTY: ICD-10-CM

## 2025-06-25 DIAGNOSIS — H61.21 IMPACTED CERUMEN OF RIGHT EAR: ICD-10-CM

## 2025-06-25 PROCEDURE — 87076 CULTURE ANAEROBE IDENT EACH: CPT | Performed by: NURSE PRACTITIONER

## 2025-06-25 PROCEDURE — 87070 CULTURE OTHR SPECIMN AEROBIC: CPT | Performed by: NURSE PRACTITIONER

## 2025-06-25 PROCEDURE — 87077 CULTURE AEROBIC IDENTIFY: CPT | Performed by: NURSE PRACTITIONER

## 2025-06-25 PROCEDURE — 1160F RVW MEDS BY RX/DR IN RCRD: CPT | Performed by: NURSE PRACTITIONER

## 2025-06-25 PROCEDURE — 87205 SMEAR GRAM STAIN: CPT | Performed by: NURSE PRACTITIONER

## 2025-06-25 PROCEDURE — 87186 SC STD MICRODIL/AGAR DIL: CPT | Performed by: NURSE PRACTITIONER

## 2025-06-25 PROCEDURE — 1159F MED LIST DOCD IN RCRD: CPT | Performed by: NURSE PRACTITIONER

## 2025-06-25 NOTE — PROCEDURES
Procedure   Wound Vac    Performed by: Sharron Piedra APRN  Authorized by: Sharron Piedra APRN  Associated wounds:   Wound 04/15/25 1646 Right lower abdomen  NPWT (Negative Pressure Wound Therapy) 25 1259 Right Abdomen and Right Lower Breast    Wound Vac:     Type of Foam:  Black    mmH    continuous      Frequency of change:  3x week    Patient tolerance:  Good

## 2025-06-25 NOTE — PROGRESS NOTES
Chief Complaint  Wound Check (Follow up on abdominal and breast wounds. NPWT off since Monday. Pt applying NS wet to dry bid. )    Subjective      History of Present Illness    Ana Weathers  is a 40 y.o. female   History of Present Illness  The patient presents today for a follow-up of wound status post plastic surgery procedures. She recently developed a new draining wound to the umbilical area.     She is s/p Abdominoplasty, breast augmentation and BBL with lipo suction performed in [place] on 03/21/2025     Patient recently completed a 7 day regimen of Levofloxacin.   She also had a CT Abdomen / Pelvis completed on 06/18/25 that revealed a 8 x 3 x 10cm fluid collection located superficial to the fascia. She was having increased pain and redness of the abdominal wall. Patient reports that a couple days after starting the antibiotics, her umbilical area spontaneously began draining a copious amount of foul smelling drainage and continued to drain and saturate dressing. It was draining so much that it was causing a seal leak for the abdominal wound vac dressing. She reports that the wound is not as severe as it was initially, with a noticeable reduction in swelling and hardness. She is currently performing NS moistened gauze dressings to the area followed by a dry dressing.     She has been applying Aquaphor to the dried crusted areas and approximated incision lines along bilateral breast and the lateral aspects of her abdomen.     She continues to have an open wound along the inferior right breast. She has been applying Iodoform packing strip and a dry dressing daily.     The open abdominal wound resulting from surgical also remains present. She has been receiving NPWT treatments 3 x week through an outpatient facility, however the vac was off today, due to reported leaks from drainage associated with the umbilical area.     She continues to experience intermittent depression but is receiving treatments managed  by her PCP. She reports overall improvement.     She currently also remains on Methadone but states that are due to begin titrating her dosing down next week as she would like to wean off of it.     She has been using ear wax removal drops for her right ear but still feels like there is something in there that she can not get out. She feels like she has even scratched it a little trying to get it out of there, using qtips and her fingernail.       Allergies:  Codeine      Current Outpatient Medications:     buPROPion SR (WELLBUTRIN SR) 150 MG 12 hr tablet, Take  by mouth 2 (Two) Times a Day., Disp: , Rfl:     methadone (DOLOPHINE) 10 MG/ML solution, Take 10.5 mL by mouth Daily., Disp: , Rfl:     Past Medical History:   Diagnosis Date    Bipolar 1 disorder     Cigarette smoker motivated to quit     Depression      Past Surgical History:   Procedure Laterality Date     SECTION      x 6    CHOLECYSTECTOMY      REPLACEMENT TOTAL HIP LATERAL POSITION       Social History     Socioeconomic History    Marital status:    Tobacco Use    Smoking status: Every Day     Current packs/day: 1.00     Types: Cigarettes     Passive exposure: Current   Vaping Use    Vaping status: Former   Substance and Sexual Activity    Alcohol use: Never    Drug use: Yes     Types: Marijuana     Comment: Pr states she smokes marijuana some over the past couple of days    Sexual activity: Defer           Objective     Vitals:    25 1304   BP: 114/50   BP Location: Left arm   Patient Position: Lying   Pulse: 72   Resp: 18  Comment: 96% ra   Temp: 98.5 °F (36.9 °C)   TempSrc: Temporal   PainSc: 6    PainLoc: Abdomen     There is no height or weight on file to calculate BMI.    The following data has been reviewed by LILY Leon on 2025     See scanned CT image from 25      STEADI Fall Risk Assessment has not been completed.     Review of Systems     ROS:  Per HPI.     I have reviewed the HPI and ROS as  documented by MA/RN. LILY Leon    Physical Exam  HENT:      Right Ear: There is impacted cerumen. Tympanic membrane is erythematous.      Ears:      Comments: Abrasion within the right ear canal         NAD  AAOx3, pleasant, cooperative    Physical Exam  Right breast: Breast wound appears healthier with a smaller opening and slightly decreased tunnel depth. A small stitch-like string is protruding from the skin, that was removed today. Wound base is healthy red and moist with minor decreased depth of the tunnel. No active drainage. No erythema or edema.   Scattered areas of crusting along the midline breast incision and surrounding the areola.     Umbilicus: Area of dehiscence along the superior margin of the umbilicus. Tunnel that extends vertically and slightly upward from the opening. Seropurulent drainage observed today. Minor redness to the elvia-wound Induration palpated with mild TTP along the right side.     Abdomen: Surgical wound dehiscence along the right lateral abdomen significantly decreased in size. Wound base is healthy pink to red and moist. Good attachment of the margins superiorly and inferiorly. The tunnel at 3 o'clock has decreased in size and is now very small. Overall good contraction of the wound. No active drainage. No signs of infection       Left breast with scattered areas of crusting. Possible suture material remains visible beneath a very thin skin surface along the midline breast wound. Scattered areas of crusting along the abdominal incision.    See photos for details.  Umbilicus:      Abdomen:       Right breast:        Result Review :  The following data was reviewed by: LILY Leon on 06/25/2025:    Prior wound care notes and images.      Assessment and Plan   Diagnoses and all orders for this visit:    1. Postoperative wound dehiscence, sequela (Primary)  -     Wound Culture - Wound, Abdominal Wall    2. Open wound of abdomen, sequela    3. Failed flap    4.  S/P abdominoplasty    5. Open wound of right female breast, sequela    6. S/P breast augmentation    7. Impacted cerumen of right ear      Assessment & Plan  1. Post-surgical wound follow-up.  Right breast wound and transverse abdominal wound are demonstrating improvements with healthy tissue to the wound base and wound margin contractions.   She developed a new wound opening along the superior umbilicus that is actively draining seropurulent drainage. Wound culture obtained.   The presence of drainage from the wound is likely the fluid collection observed on the CT scan. The redness on the abdomen has improved significantly from prior photos.   Patient just completed a round of antibiotics, however pending wound culture results an additional antibiotic regimen may be initiated.   Recommending that she continue and begin daily dressing changes to the right breast wound and umbilical wound that consist of cleansing / irrigating the wound with NS or wound cleanser, blotting dry, applying Iodoform packing strip to the openings that is then secured with a dry dressing.   Recommending that she continue NPWT therapy 3 x week utilizing black foam only. White foam no longer needed. This can be changed by outpatient services at [facility] due to proximity to her home.   She has been advised to avoid overpacking the wounds.   She should continue to monitor the areas for any worsening or new symptoms and report immediately as follow-up with Plastics may be warranted and was discussed today.    2. Depression.  She reports that her depression comes in waves and she is currently managing with medications managed by her PCP. She is advised to continue monitoring her symptoms and seek further evaluation if her depression worsens.    3. Right ear cerumen impaction.  The right ear still contains wax, but the eardrum is now visible. The ear canal appears red and irritated, likely due to scratching. She has been advised to  continue using Debrox drops for a few more days, followed by peroxide irrigation to aid in wax removal. She has been cautioned against inserting Q-tips or fingers into her ear.    Follow-up in 2 weeks     Patient was given instructions and counseling regarding their condition or for health maintenance advice, as well as the wound care plan and recommendations. They understand and agree with the plan.  They will follow back up here in the clinic but are instructed to contact us in the interim should they have any new, returning, or worsening symptoms or concerns. Please see specific information pulled into the AVS if appropriate.     Dragon Dictation utilized for chart completion.    I spent 39 minutes in both face-to-face and nonface-to-face time for patient care today including, but not limited to, review of old records, reviewing old images, history and physical exam, reviewing test results, counseling patient, entering orders, coordinating care, and documenting.      Follow Up   Return in about 2 weeks (around 7/9/2025) for Wound Check.      LILY Leon    Patient or patient representative verbalized consent for the use of Ambient Listening during the visit with  LILY Leon for chart documentation. 6/25/2025  14:41 EDT

## 2025-06-29 LAB
BACTERIA SPEC AEROBE CULT: ABNORMAL
BACTERIA SPEC AEROBE CULT: ABNORMAL
GRAM STN SPEC: ABNORMAL
GRAM STN SPEC: ABNORMAL

## 2025-06-30 ENCOUNTER — RESULTS FOLLOW-UP (OUTPATIENT)
Dept: WOUND CARE | Facility: HOSPITAL | Age: 40
End: 2025-06-30
Payer: COMMERCIAL

## 2025-06-30 DIAGNOSIS — T81.31XS POSTOPERATIVE WOUND DEHISCENCE, SEQUELA: Primary | ICD-10-CM

## 2025-06-30 DIAGNOSIS — L08.9 WOUND INFECTION: ICD-10-CM

## 2025-06-30 DIAGNOSIS — S31.109S OPEN WOUND OF ABDOMEN, SEQUELA: ICD-10-CM

## 2025-06-30 DIAGNOSIS — T14.8XXA WOUND INFECTION: ICD-10-CM

## 2025-07-09 ENCOUNTER — OFFICE VISIT (OUTPATIENT)
Dept: WOUND CARE | Facility: HOSPITAL | Age: 40
End: 2025-07-09
Payer: COMMERCIAL

## 2025-07-09 VITALS
RESPIRATION RATE: 18 BRPM | DIASTOLIC BLOOD PRESSURE: 80 MMHG | HEART RATE: 68 BPM | SYSTOLIC BLOOD PRESSURE: 126 MMHG | TEMPERATURE: 99 F

## 2025-07-09 DIAGNOSIS — S21.001S: ICD-10-CM

## 2025-07-09 DIAGNOSIS — T81.31XS POSTOPERATIVE WOUND DEHISCENCE, SEQUELA: Primary | ICD-10-CM

## 2025-07-09 DIAGNOSIS — T14.8XXA WOUND INFECTION: ICD-10-CM

## 2025-07-09 DIAGNOSIS — S31.109S OPEN WOUND OF ABDOMEN, SEQUELA: ICD-10-CM

## 2025-07-09 DIAGNOSIS — T86.821 FAILED FLAP: ICD-10-CM

## 2025-07-09 DIAGNOSIS — Z98.82 S/P BREAST AUGMENTATION: ICD-10-CM

## 2025-07-09 DIAGNOSIS — L08.9 WOUND INFECTION: ICD-10-CM

## 2025-07-09 DIAGNOSIS — Z98.890 S/P ABDOMINOPLASTY: ICD-10-CM

## 2025-07-09 PROCEDURE — 1160F RVW MEDS BY RX/DR IN RCRD: CPT | Performed by: NURSE PRACTITIONER

## 2025-07-09 PROCEDURE — 97605 NEG PRS WND THER DME<=50SQCM: CPT | Performed by: NURSE PRACTITIONER

## 2025-07-09 PROCEDURE — 1159F MED LIST DOCD IN RCRD: CPT | Performed by: NURSE PRACTITIONER

## 2025-07-09 RX ORDER — MAGNESIUM HYDROXIDE 1200 MG/15ML
1000 LIQUID ORAL DAILY
Qty: 100 ML | Refills: 2 | Status: SHIPPED | OUTPATIENT
Start: 2025-07-09

## 2025-07-09 NOTE — PROGRESS NOTES
Chief Complaint  Wound Check (Follow up visit for wounds to abdomen and breast. Patient had wound vac but has not had on since Thursday.  Patient has been doing wet to dry.  Patient is not diabetic.  Patient is a smoker.)    Subjective      History of Present Illness    Ana Weathers  is a 40 y.o. female   History of Present Illness  The patient presents today for a follow-up of open wounds to the right breast, umbilicus, and abdomen status post plastic surgery procedures.    She is s/p Abdominoplasty, breast augmentation and BBL with lipo suction performed in [place] on 2025     She has completed her recent course of antibiotics.     She reports that the area around the umbilicus remains hard and swollen, so she was unsure if the antibiotics were effective. She discontinued the use of her Vac last week, requesting a break from the machine. Her current wound care regimen has consisted of applying iodoform packing strip to the right breast wound and umbilical wound daily. She additionally has been doing a NS moistened gauze dressing to the open abdominal wound daily.     She is currently reducing her methadone dose and has discontinued her Wellbutrin as she needs to find a new provider.     She denies any new issues or concerns today.    SOCIAL HISTORY  The patient admits to smoking 2 PPD.      Allergies:  Codeine      Current Outpatient Medications:     methadone (DOLOPHINE) 10 MG/ML solution, Take 10.5 mL by mouth Daily., Disp: , Rfl:     sodium chloride (NS) 0.9 % irrigation, Irrigate with 1,000 mL to the affected area as directed by provider Daily., Disp: 100 mL, Rfl: 2    Past Medical History:   Diagnosis Date    Bipolar 1 disorder     Cigarette smoker motivated to quit     Depression      Past Surgical History:   Procedure Laterality Date     SECTION      x 6    CHOLECYSTECTOMY      REPLACEMENT TOTAL HIP LATERAL POSITION       Social History     Socioeconomic History    Marital status:     Tobacco Use    Smoking status: Every Day     Current packs/day: 1.00     Types: Cigarettes     Passive exposure: Current   Vaping Use    Vaping status: Former   Substance and Sexual Activity    Alcohol use: Never    Drug use: Yes     Types: Marijuana     Comment: Pr states she smokes marijuana some over the past couple of days    Sexual activity: Defer         Objective     Vitals:    07/09/25 1314   BP: 126/80   BP Location: Left arm   Patient Position: Sitting   Cuff Size: Adult   Pulse: 68   Resp: 18  Comment: 97% room air   Temp: 99 °F (37.2 °C)   TempSrc: Temporal   PainSc: 5    PainLoc: Abdomen     There is no height or weight on file to calculate BMI.    The following data has been reviewed by LILY Leon on 07/09/2025       Culture results from last 30 days   Lab 06/25/25  1438   WOUNDCX Light growth (2+) Corynebacterium species*  Light growth (2+) Enterococcus faecalis*        No Images in the past 120 days found..     STEADI Fall Risk Assessment has not been completed.     Review of Systems     ROS:  Per HPI.     I have reviewed the HPI and ROS as documented by MA/RN. LILY Leon    Physical Exam     NAD  AAOx3, pleasant, cooperative    Physical Exam  Right breast: Open wound to the right inferior breast that has significantly decreased in size and depth.  The wound margins are alba with increasing epithelialization. No active drainage. No signs of infection. No induration.    Umbilicus: Previously noted opening along the superior incision line of the umbilicus is closed and epithelialized. The umbilical tissue has minor redness along the base of the tissue. There is no drainage to be expressed and no active drainage. No palpable induration. Scattered areas of redness along the abdominal wall but overall improved from last visit.     Transverse Abdomen: Open wound along the lower abdomen that has significantly decreased in size. There is minimal tunneling at 3 o'clock and  the wound margins are alba. Wound base overall is shallow and demonstrates pink moist tissue. There is no active drainage. María-wound dermatitis. No active signs of infections.     See photos for details.  Right breast:      Abdomen:      Umbilicus:        Result Review :  The following data was reviewed by: LILY Leon on 07/09/2025:    Prior wound care notes and images.    Assessment and Plan   Diagnoses and all orders for this visit:    1. Postoperative wound dehiscence, sequela (Primary)    2. Open wound of abdomen, sequela  -     sodium chloride (NS) 0.9 % irrigation; Irrigate with 1,000 mL to the affected area as directed by provider Daily.  Dispense: 100 mL; Refill: 2    3. Wound infection    4. Failed flap    5. S/P abdominoplasty    6. Open wound of right female breast, sequela  -     sodium chloride (NS) 0.9 % irrigation; Irrigate with 1,000 mL to the affected area as directed by provider Daily.  Dispense: 100 mL; Refill: 2    7. S/P breast augmentation      Assessment & Plan  1. Post-surgical open wounds.  The wounds are showing signs of improvement. There is an overall decrease in size and depth of the right breast wound and closure of the previously noted open wound along the superior umbilicus. The abdominal wound has greatly decreased in size and contracted with only minimal tunneling at the 3 o'clock margin. There is no active drainage and there are no active signs of infection.   Recommending that the patient resume daily dressing changes to the right breast that consist of cleansing / irrigating the wound, blotting dry, applying iodoform within the wound base and securing with a silicone border dressing.   Recommending continuation of NPWT therapy to the transverse abdomen to promote more rapid closure. NPWT dressing changes should be performed 3 x week. The dressing will need to be applied in more of incisional fashion to allow for better seal and to promote continued closure.    In the event of wound vac failure or seal leaks, patient should resume NS moistened gauze dressings daily.   No further wound care is needed to the umbilical area. She is encouraged to keep the area clean and dry.     If the VAC dressing can no longer be applied, she or the outpatient healthcare provider should take a photograph of the wound and send it to us for further recommendations.    2. Medication management.  She has stopped taking Wellbutrin and is currently weaning down on methadone. She reports feeling okay without the Wellbutrin. She is encouraged to find a new provider for ongoing management.     3. Smoking cessation.   Patient is currently smoking approximately 2 PPD. She is encouraged to work towards smoking cessation to promote general health and wound healing.     Follow-up  A follow-up visit is scheduled in 4 weeks.      Patient was given instructions and counseling regarding their condition or for health maintenance advice, as well as the wound care plan and recommendations. They understand and agree with the plan.  They will follow back up here in the clinic but are instructed to contact us in the interim should they have any new, returning, or worsening symptoms or concerns. Please see specific information pulled into the AVS if appropriate.     Dragon Dictation utilized for chart completion.    Follow Up   Return in about 4 weeks (around 8/6/2025) for Wound Check.      LILY Leon    Patient or patient representative verbalized consent for the use of Ambient Listening during the visit with  LILY Leon for chart documentation. 7/9/2025  14:24 EDT

## 2025-07-09 NOTE — LETTER
July 9, 2025     Patient: Ana Weathers   YOB: 1985   Date of Visit: 7/9/2025       To Whom It May Concern:    Ana Weathers was seen in the wound care clinic, today, July 9, 2025.           Sincerely,        LILY Leon    CC: No Recipients

## 2025-08-06 ENCOUNTER — TELEPHONE (OUTPATIENT)
Dept: WOUND CARE | Facility: HOSPITAL | Age: 40
End: 2025-08-06
Payer: COMMERCIAL